# Patient Record
Sex: FEMALE | Race: WHITE | NOT HISPANIC OR LATINO | Employment: FULL TIME | URBAN - METROPOLITAN AREA
[De-identification: names, ages, dates, MRNs, and addresses within clinical notes are randomized per-mention and may not be internally consistent; named-entity substitution may affect disease eponyms.]

---

## 2022-04-14 ENCOUNTER — INITIAL PRENATAL (OUTPATIENT)
Dept: OBSTETRICS AND GYNECOLOGY | Facility: CLINIC | Age: 27
End: 2022-04-14

## 2022-04-14 VITALS
SYSTOLIC BLOOD PRESSURE: 128 MMHG | HEIGHT: 64 IN | BODY MASS INDEX: 22.71 KG/M2 | WEIGHT: 133 LBS | DIASTOLIC BLOOD PRESSURE: 74 MMHG

## 2022-04-14 DIAGNOSIS — Z34.80 SUPERVISION OF OTHER NORMAL PREGNANCY, ANTEPARTUM: Primary | ICD-10-CM

## 2022-04-14 DIAGNOSIS — Z98.891 PREVIOUS CESAREAN SECTION: ICD-10-CM

## 2022-04-14 DIAGNOSIS — Z32.01 PREGNANCY TEST PERFORMED, PREGNANCY CONFIRMED: ICD-10-CM

## 2022-04-14 LAB
ABO GROUP BLD: NORMAL
B-HCG UR QL: POSITIVE
BASOPHILS # BLD AUTO: 0.03 10*3/MM3 (ref 0–0.2)
BASOPHILS NFR BLD AUTO: 0.5 % (ref 0–1.5)
BLD GP AB SCN SERPL QL: NEGATIVE
DEPRECATED RDW RBC AUTO: 40 FL (ref 37–54)
EOSINOPHIL # BLD AUTO: 0.03 10*3/MM3 (ref 0–0.4)
EOSINOPHIL NFR BLD AUTO: 0.5 % (ref 0.3–6.2)
ERYTHROCYTE [DISTWIDTH] IN BLOOD BY AUTOMATED COUNT: 11.7 % (ref 12.3–15.4)
EXPIRATION DATE: ABNORMAL
GLUCOSE UR STRIP-MCNC: NEGATIVE MG/DL
HBV SURFACE AG SERPL QL IA: NORMAL
HCT VFR BLD AUTO: 43.4 % (ref 34–46.6)
HCV AB SER DONR QL: NORMAL
HGB BLD-MCNC: 14.5 G/DL (ref 12–15.9)
HIV1+2 AB SER QL: NORMAL
IMM GRANULOCYTES # BLD AUTO: 0.02 10*3/MM3 (ref 0–0.05)
IMM GRANULOCYTES NFR BLD AUTO: 0.3 % (ref 0–0.5)
INTERNAL NEGATIVE CONTROL: ABNORMAL
INTERNAL POSITIVE CONTROL: ABNORMAL
LYMPHOCYTES # BLD AUTO: 1.99 10*3/MM3 (ref 0.7–3.1)
LYMPHOCYTES NFR BLD AUTO: 30.4 % (ref 19.6–45.3)
Lab: ABNORMAL
MCH RBC QN AUTO: 32.2 PG (ref 26.6–33)
MCHC RBC AUTO-ENTMCNC: 33.4 G/DL (ref 31.5–35.7)
MCV RBC AUTO: 96.2 FL (ref 79–97)
MONOCYTES # BLD AUTO: 0.5 10*3/MM3 (ref 0.1–0.9)
MONOCYTES NFR BLD AUTO: 7.6 % (ref 5–12)
NEUTROPHILS NFR BLD AUTO: 3.98 10*3/MM3 (ref 1.7–7)
NEUTROPHILS NFR BLD AUTO: 60.7 % (ref 42.7–76)
NRBC BLD AUTO-RTO: 0 /100 WBC (ref 0–0.2)
PLATELET # BLD AUTO: 313 10*3/MM3 (ref 140–450)
PMV BLD AUTO: 10.1 FL (ref 6–12)
PROT UR STRIP-MCNC: NEGATIVE MG/DL
RBC # BLD AUTO: 4.51 10*6/MM3 (ref 3.77–5.28)
RH BLD: POSITIVE
WBC NRBC COR # BLD: 6.55 10*3/MM3 (ref 3.4–10.8)

## 2022-04-14 PROCEDURE — G0432 EIA HIV-1/HIV-2 SCREEN: HCPCS | Performed by: STUDENT IN AN ORGANIZED HEALTH CARE EDUCATION/TRAINING PROGRAM

## 2022-04-14 PROCEDURE — 81025 URINE PREGNANCY TEST: CPT | Performed by: STUDENT IN AN ORGANIZED HEALTH CARE EDUCATION/TRAINING PROGRAM

## 2022-04-14 PROCEDURE — 87661 TRICHOMONAS VAGINALIS AMPLIF: CPT | Performed by: STUDENT IN AN ORGANIZED HEALTH CARE EDUCATION/TRAINING PROGRAM

## 2022-04-14 PROCEDURE — G0123 SCREEN CERV/VAG THIN LAYER: HCPCS | Performed by: STUDENT IN AN ORGANIZED HEALTH CARE EDUCATION/TRAINING PROGRAM

## 2022-04-14 PROCEDURE — 87591 N.GONORRHOEAE DNA AMP PROB: CPT | Performed by: STUDENT IN AN ORGANIZED HEALTH CARE EDUCATION/TRAINING PROGRAM

## 2022-04-14 PROCEDURE — 86803 HEPATITIS C AB TEST: CPT | Performed by: STUDENT IN AN ORGANIZED HEALTH CARE EDUCATION/TRAINING PROGRAM

## 2022-04-14 PROCEDURE — 83020 HEMOGLOBIN ELECTROPHORESIS: CPT | Performed by: STUDENT IN AN ORGANIZED HEALTH CARE EDUCATION/TRAINING PROGRAM

## 2022-04-14 PROCEDURE — 87086 URINE CULTURE/COLONY COUNT: CPT | Performed by: STUDENT IN AN ORGANIZED HEALTH CARE EDUCATION/TRAINING PROGRAM

## 2022-04-14 PROCEDURE — 0501F PRENATAL FLOW SHEET: CPT | Performed by: STUDENT IN AN ORGANIZED HEALTH CARE EDUCATION/TRAINING PROGRAM

## 2022-04-14 PROCEDURE — 87491 CHLMYD TRACH DNA AMP PROBE: CPT | Performed by: STUDENT IN AN ORGANIZED HEALTH CARE EDUCATION/TRAINING PROGRAM

## 2022-04-14 NOTE — PROGRESS NOTES
"OB Initial Visit    CC- Here for care of current pregnancy     DENNIS Finalized: Unable to finalize dates, needs dating U/S    Subjective:  26 y.o.  presenting for her first obstetrical visit.    LMP: Patient's last menstrual period was 2022. sure    COMPLAINS OF: Pt has no complaints, is doing well    Zohra Cosme is a 26 y.o.  5w2d.   D&C . Did not had a period until March. She was using an licha and this lined up with when she was to have her menses. No VB, some nausea and vomiting. Not needing any meds. Has been taking a PNV. G1 delivered at 38 weeks via C/S for fetal intolerance upon evaluation for ruptured membranes. She delivered at [facility]. Denies any complications during antepartum course. 2019. G2 2021 found out pregnant. She went 4 10 week, had a blighted ovum and underwent a D&C. No other surgeries besides C/S and D&C. No hx of CV or pulm. Nonsmoker, no alcohol, no illicit. No STIs.        Reviewed and updated:  OBHx, GYNHx (STDs), PMHx, Medications, Allergies, PSHx, Social Hx, Preventative Hx (PAP), Hx of abuse/safe environment, Vaccine Hx including hx of chickenpox or vaccine, Genetic Hx (pt, FOB, both families).        Objective:  /74   Ht 162.6 cm (64\")   Wt 60.3 kg (133 lb)   LMP 2022   BMI 22.83 kg/m²   General- NAD, alert and oriented, appropriate  Psych- Normal mood, good memory  Neck- No masses, no thyroid enlargement  CV- Regular rhythm, no murnurs  Resp- CTA to bases, no wheezes  Abdomen- Soft, non distended, non tender, no masses,     Breast left- No mass, non tender, no nipple discharge  Breast right- No mass, non tender, no nipple discharge    External genitalia- Normal, no lesions  Urethra- Normal, no masses, non tender  Vagina- Normal, no discharge  Bladder- Normal, no masses, non tender  Cvx- Normal, no lesions, no discharge, no CMT  Uterus- Normal shape and consistency, non tender  Adnexa- Normal, no mass, non " tender    Lymphatic- No palpable neck, axillary, or groin nodes  Ext- No edema, no cyanosis    Skin- No lesions, no rashes, no acanthosis nigricans      Assessment and Plan:  Diagnoses and all orders for this visit:    1. Supervision of other normal pregnancy, antepartum (Primary)  Overview:  Initial visit:  • PNL: 2022   • PAP/GC/CT/Urine culture:  • Blood type:  • Hx of HSV?:  No    Genetic testing:    • Considering   • CF  • NIPS    Vaccinations:  • COVID: 2022 recommended, Hx of COVID x2     24-28 weeks:  • 1hr GCT:  • Hct/Plt:  • Tdap Rx  • Rhogam indicated:      Third Trimester:  • GBS  • Breast pump:    Ultrasound:  • Datin2022 ordered  • Anatomy:   • Follow up:     PLAN:          Orders:  -     POC Urinalysis Dipstick  -     Urine Culture - Urine, Urine, Clean Catch  -     Hemoglobinopathy Fractionation [place]; Future  -     OB Panel With HIV; Future  -     US Ob < 14 Weeks Single or First Gestation; Future  -     IGP,CtNgTv,rfx Aptima HPV ASCU    2. Pregnancy test performed, pregnancy confirmed  -     POC Pregnancy, Urine    3. Previous  section  Overview:  2022 Fetal intolerance             5w2d    Genetic Screening: Considering   CF  NIPS    Vaccines: Recommend COVID vaccine, R/B discussed    Counseling: Nutrition discussed, calories, activity/exercise in pregnancy  Discussed dietary restrictions/safety food preparation in pregnancy  Reviewed what to expect prenatal visits, office providers  Appropriate trimester precautions provided, N/V, vag bleeding, cramping  Questions answered    Labs: Prenatal labs, cultures, and PAP performed (prn)    Return in about 6 weeks (around 2022) for Next scheduled follow up.      Gordon English MD  2022

## 2022-04-15 LAB
BACTERIA SPEC AEROBE CULT: NO GROWTH
HGB A MFR BLD ELPH: 97.3 % (ref 96.4–98.8)
HGB A2 MFR BLD ELPH: 2.7 % (ref 1.8–3.2)
HGB F MFR BLD ELPH: 0 % (ref 0–2)
HGB FRACT BLD-IMP: NORMAL
HGB S MFR BLD ELPH: 0 %
RPR SER QL: NORMAL
RUBV IGG SERPL IA-ACNC: 1.07 INDEX

## 2022-04-20 LAB
C TRACH RRNA CVX QL NAA+PROBE: NEGATIVE
CONV .: NORMAL
CYTOLOGIST CVX/VAG CYTO: NORMAL
CYTOLOGY CVX/VAG DOC CYTO: NORMAL
CYTOLOGY CVX/VAG DOC THIN PREP: NORMAL
DX ICD CODE: NORMAL
HIV 1 & 2 AB SER-IMP: NORMAL
N GONORRHOEA RRNA CVX QL NAA+PROBE: NEGATIVE
OTHER STN SPEC: NORMAL
STAT OF ADQ CVX/VAG CYTO-IMP: NORMAL
T VAGINALIS RRNA SPEC QL NAA+PROBE: NEGATIVE

## 2022-04-25 ENCOUNTER — TELEPHONE (OUTPATIENT)
Dept: OBSTETRICS AND GYNECOLOGY | Facility: CLINIC | Age: 27
End: 2022-04-25

## 2022-04-25 DIAGNOSIS — Z34.80 SUPERVISION OF OTHER NORMAL PREGNANCY, ANTEPARTUM: Primary | ICD-10-CM

## 2022-04-25 NOTE — TELEPHONE ENCOUNTER
Per Dr. Martin: please let the patient know that her ultrasound looks normal but the fetal heart rate appears very early.  I would like to repeat the ultrasound in about 2 weeks just to ensure that everything continues to develop normally.  She should have a follow-up with Dr. English after that ultrasound.    Left message for the patient to advised her of Dr. Martin's recommendations and set up the needed appointments.

## 2022-04-25 NOTE — TELEPHONE ENCOUNTER
Patient informed of results and recommendations. She is scheduled for the repeat scan 5/9 and to see Dr. Martin afterwards since Dr. English is out.

## 2022-05-09 ENCOUNTER — ROUTINE PRENATAL (OUTPATIENT)
Dept: OBSTETRICS AND GYNECOLOGY | Facility: CLINIC | Age: 27
End: 2022-05-09

## 2022-05-09 VITALS — DIASTOLIC BLOOD PRESSURE: 68 MMHG | SYSTOLIC BLOOD PRESSURE: 118 MMHG | WEIGHT: 135 LBS | BODY MASS INDEX: 23.17 KG/M2

## 2022-05-09 DIAGNOSIS — Z34.80 SUPERVISION OF OTHER NORMAL PREGNANCY, ANTEPARTUM: Primary | ICD-10-CM

## 2022-05-09 DIAGNOSIS — Z98.891 PREVIOUS CESAREAN SECTION: ICD-10-CM

## 2022-05-09 LAB
GLUCOSE UR STRIP-MCNC: NEGATIVE MG/DL
PROT UR STRIP-MCNC: NEGATIVE MG/DL

## 2022-05-09 PROCEDURE — 0502F SUBSEQUENT PRENATAL CARE: CPT | Performed by: OBSTETRICS & GYNECOLOGY

## 2022-05-09 NOTE — PROGRESS NOTES
OB FOLLOW UP    CC: Scheduled OB routine FU     Prenatal care complicated by:   Patient Active Problem List   Diagnosis   • Supervision of other normal pregnancy, antepartum   • Previous  section       Subjective:   Patient has: No complaints, No leaking fluid, No vaginal bleeding, No contractions    Objective:  Urine glucose/protein- see flow sheet      /68   Wt 61.2 kg (135 lb)   LMP 2022   BMI 23.17 kg/m²   See OB flow for LE edema, and cvx exam if applicable  FHT: 181 BPM   Uterine Size: size equals dates    Study Result    Date of study: 2022  Indications: Unsure dates  Comparisons: None  Techniques: Transvaginal ultrasound     There is a helms intrauterine gestation.  Cardiac activity is noted at 182 bpm.  The average crown-rump length is 1.79 cm.  This yielded an estimated gestational age by today's study of 8 weeks and 2 days and an DENNIS of 2022.  This is consistent with the patient's EDC by LMP of 2022.  The best EDC will then be 2022.  The ovaries are not well visualized.  No gross abnormalities are noted.  The study is limited by gestational age.     Electronically signed by Dave Martin MD, 05/10/22, 8:47 AM EDT.      Assessment and Plan:  Diagnoses and all orders for this visit:    1. Supervision of other normal pregnancy, antepartum (Primary)  Overview:  Genetic testing:    • Considering     Vaccinations:  • COVID: 2022 recommended, Hx of COVID x2     Assessment & Plan:  Reviewed prenatal labs.  Reviewed today's ultrasound.  EDC confirmed.  Patient is still considering if she desires prenatal genetic screening.  Continue prenatal vitamins    Orders:  -     POC Urinalysis Dipstick    2. Previous  section  Overview:  2022 Fetal intolerance       8w6d  Reassuring pregnancy progress    Counseling: First trimester precautions, bleeding, cramping, N/V    Questions answered    Return in about 4 weeks (around 2022) for  Juju.      Dave Martin MD  05/09/2022

## 2022-05-15 NOTE — ASSESSMENT & PLAN NOTE
Reviewed prenatal labs.  Reviewed today's ultrasound.  EDC confirmed.  Patient is still considering if she desires prenatal genetic screening.  Continue prenatal vitamins

## 2022-06-07 ENCOUNTER — ROUTINE PRENATAL (OUTPATIENT)
Dept: OBSTETRICS AND GYNECOLOGY | Facility: CLINIC | Age: 27
End: 2022-06-07

## 2022-06-07 VITALS — SYSTOLIC BLOOD PRESSURE: 117 MMHG | BODY MASS INDEX: 23.62 KG/M2 | DIASTOLIC BLOOD PRESSURE: 77 MMHG | WEIGHT: 137.6 LBS

## 2022-06-07 DIAGNOSIS — Z34.80 SUPERVISION OF OTHER NORMAL PREGNANCY, ANTEPARTUM: Primary | ICD-10-CM

## 2022-06-07 DIAGNOSIS — Z98.891 PREVIOUS CESAREAN SECTION: ICD-10-CM

## 2022-06-07 LAB
GLUCOSE UR STRIP-MCNC: NEGATIVE MG/DL
PROT UR STRIP-MCNC: NEGATIVE MG/DL

## 2022-06-07 PROCEDURE — 0502F SUBSEQUENT PRENATAL CARE: CPT | Performed by: OBSTETRICS & GYNECOLOGY

## 2022-06-07 NOTE — PROGRESS NOTES
OB FOLLOW UP    CC: Scheduled OB routine FU     Prenatal care complicated by:   Patient Active Problem List   Diagnosis   • Supervision of other normal pregnancy, antepartum   • Previous  section       Subjective:   Patient has: No complaints, No leaking fluid, No vaginal bleeding, No contractions  No fetal movement yet    Objective:  Urine glucose/protein- see flow sheet      /77   Wt 62.4 kg (137 lb 9.6 oz)   LMP 2022   BMI 23.62 kg/m²   See OB flow for LE edema, and cvx exam if applicable  FHT: 165 BPM   Uterine Size: size equals dates      Assessment and Plan:  Diagnoses and all orders for this visit:    1. Supervision of other normal pregnancy, antepartum (Primary)  Overview:  Initial visit:  • PNL: 2022   • PAP/GC/CT/Urine culture:  • Blood type:  • Hx of HSV?:  No    Genetic testing:    Nips:    Vaccinations:  • COVID: 2022 recommended, Hx of COVID x2     24-28 weeks:  • 1hr GCT:  • Hct/Plt:  • Tdap Rx  • Rhogam indicated:      Third Trimester:  • GBS  • Breast pump:    Ultrasound:  • Datin2022 ordered  • Anatomy:   • Follow up:     Assessment & Plan:  Reviewed prenatal labs.  Reviewed dating ultrasound and final EDC.  Patient has elected to proceed with nips testing  Continue prenatal vitamins  Anatomy ultrasound at around 20 weeks of gestation    Orders:  -     POC Urinalysis Dipstick  -     INVITAE NIPS  -     US Ob Detail Fetal Anatomy Single or First Gestation; Future    2. Previous  section  Overview:  2022 Fetal intolerance           13w0d  Reassuring pregnancy progress    Counseling: Second trimester precautions  OB precautions, leaking, VB, frances epps vs PTL/Labor    Questions answered    Return in about 4 weeks (around 2022) for Recheck.      Dave Martin MD  2022

## 2022-06-08 NOTE — ASSESSMENT & PLAN NOTE
Reviewed prenatal labs.  Reviewed dating ultrasound and final EDC.  Patient has elected to proceed with nips testing  Continue prenatal vitamins  Anatomy ultrasound at around 20 weeks of gestation

## 2022-06-17 LAB — REF LAB TEST METHOD: NORMAL

## 2022-07-05 ENCOUNTER — ROUTINE PRENATAL (OUTPATIENT)
Dept: OBSTETRICS AND GYNECOLOGY | Facility: CLINIC | Age: 27
End: 2022-07-05

## 2022-07-05 VITALS — BODY MASS INDEX: 23.52 KG/M2 | WEIGHT: 137 LBS | SYSTOLIC BLOOD PRESSURE: 113 MMHG | DIASTOLIC BLOOD PRESSURE: 70 MMHG

## 2022-07-05 DIAGNOSIS — B00.1 HERPES LABIALIS: ICD-10-CM

## 2022-07-05 DIAGNOSIS — Z34.80 SUPERVISION OF OTHER NORMAL PREGNANCY, ANTEPARTUM: Primary | ICD-10-CM

## 2022-07-05 DIAGNOSIS — Z98.891 PREVIOUS CESAREAN SECTION: ICD-10-CM

## 2022-07-05 LAB
GLUCOSE UR STRIP-MCNC: NEGATIVE MG/DL
PROT UR STRIP-MCNC: NEGATIVE MG/DL

## 2022-07-05 PROCEDURE — 0502F SUBSEQUENT PRENATAL CARE: CPT | Performed by: OBSTETRICS & GYNECOLOGY

## 2022-07-05 NOTE — PROGRESS NOTES
OB FOLLOW UP    CC: Scheduled OB routine FU     Prenatal care complicated by:   Patient Active Problem List   Diagnosis   • Supervision of other normal pregnancy, antepartum   • Previous  section   • Herpes labialis       Subjective:   Patient has: No complaints, No leaking fluid, No vaginal bleeding, No contractions  No fetal movement yet    Objective:  Urine glucose/protein- see flow sheet      /70   Wt 62.1 kg (137 lb)   LMP 2022   BMI 23.52 kg/m²   See OB flow for LE edema, and cvx exam if applicable  FHT: 159 BPM   Uterine Size: size equals dates      Assessment and Plan:  Diagnoses and all orders for this visit:    1. Supervision of other normal pregnancy, antepartum (Primary)  Assessment & Plan:  Continue prenatal vitamins  Anatomy ultrasound next office visit    Orders:  -     POC Urinalysis Dipstick    2. Previous  section  Overview:  2022 Fetal intolerance       3. Herpes labialis  Overview:  Valtrex suppression at 36 weeks          17w0d  Reassuring pregnancy progress    Counseling: Second trimester precautions  OB precautions, leaking, VB, frances epps vs PTL/Labor    Questions answered    Return in about 4 weeks (around 2022).      Dave Martin MD  2022

## 2022-07-26 ENCOUNTER — ROUTINE PRENATAL (OUTPATIENT)
Dept: OBSTETRICS AND GYNECOLOGY | Facility: CLINIC | Age: 27
End: 2022-07-26

## 2022-07-26 VITALS — SYSTOLIC BLOOD PRESSURE: 113 MMHG | BODY MASS INDEX: 24.37 KG/M2 | DIASTOLIC BLOOD PRESSURE: 74 MMHG | WEIGHT: 142 LBS

## 2022-07-26 DIAGNOSIS — Z34.80 SUPERVISION OF OTHER NORMAL PREGNANCY, ANTEPARTUM: Primary | ICD-10-CM

## 2022-07-26 DIAGNOSIS — Z98.891 PREVIOUS CESAREAN SECTION: ICD-10-CM

## 2022-07-26 LAB
GLUCOSE UR STRIP-MCNC: NEGATIVE MG/DL
PROT UR STRIP-MCNC: ABNORMAL MG/DL

## 2022-07-26 PROCEDURE — 0502F SUBSEQUENT PRENATAL CARE: CPT | Performed by: OBSTETRICS & GYNECOLOGY

## 2022-07-26 NOTE — PROGRESS NOTES
OB FOLLOW UP    CC: Scheduled OB routine FU     Prenatal care complicated by:   Patient Active Problem List   Diagnosis   • Supervision of other normal pregnancy, antepartum   • Previous  section   • Herpes labialis       Subjective:   Patient has: No complaints, No leaking fluid, No vaginal bleeding, No contractions, Adequate FM    Objective:  Urine glucose/protein- see flow sheet      /74   Wt 64.4 kg (142 lb)   LMP 2022   BMI 24.37 kg/m²   See OB flow for LE edema, and cvx exam if applicable  FHT: 155 BPM   Uterine Size: 20 cm    Study Result    Date of study: 2022  Indications: Anatomy  Comparisons: None  Techniques: Transabdominal ultrasound     There is a helms intrauterine gestation in the cephalic presentation.  The estimated fetal weight is 293 g or 10 ounces.  The estimated gestational age is 19 weeks and 5 days with an DENNIS of 12/15/2022 by today's study.  This is consistent with the previously established DENNIS of 2022.  The fetal heart rate is 155 bpm.  The amniotic fluid is subjectively adequate.  The placenta is posterior, grade 1.  There is no evidence of low-lying placenta or placenta previa.  There is a three-vessel umbilical cord with normal-appearing cord insertion.  The fetal head is low in the pelvis providing suboptimal images of the fetal profile and cranial structures.  No anatomical abnormalities are noted.  The gender appears female.  At this time the anatomical survey remains incomplete and a follow-up study is recommended.     Electronically signed by Dave Martin MD, 22, 7:37 AM EDT.    Assessment and Plan:  Diagnoses and all orders for this visit:    1. Supervision of other normal pregnancy, antepartum (Primary)  Overview:    Genetic testing:    Nips: Neg    Vaccinations:  • COVID: 2022 recommended, Hx of COVID x2       Ultrasound:  • Datin2022: Suboptimal profile and cranial structures.  Incomplete anatomy.  Posterior  placenta.  Follow-up anatomy ultrasound ordered.    Assessment & Plan:  Reviewed today's anatomy ultrasound.  The anatomy is incomplete.  Plan follow-up ultrasound in 4 weeks.  Continue prenatal vitamins  1 hour GTT next office visit    Orders:  -     POC Urinalysis Dipstick  -     US Ob 14 + Weeks Single or First Gestation; Future    2. Previous  section  Overview:  2022 Fetal intolerance - desires repeat c/s          20w0d  Reassuring pregnancy progress    Counseling: Second trimester precautions  OB precautions, leaking, VB, frances epps vs PTL/Labor    Questions answered    Return in about 4 weeks (around 2022) for Recheck.      Dave Martin MD  2022

## 2022-07-28 NOTE — ASSESSMENT & PLAN NOTE
Reviewed today's anatomy ultrasound.  The anatomy is incomplete.  Plan follow-up ultrasound in 4 weeks.  Continue prenatal vitamins  1 hour GTT next office visit

## 2022-08-24 ENCOUNTER — ROUTINE PRENATAL (OUTPATIENT)
Dept: OBSTETRICS AND GYNECOLOGY | Facility: CLINIC | Age: 27
End: 2022-08-24

## 2022-08-24 VITALS — SYSTOLIC BLOOD PRESSURE: 99 MMHG | DIASTOLIC BLOOD PRESSURE: 65 MMHG | WEIGHT: 145.4 LBS | BODY MASS INDEX: 24.96 KG/M2

## 2022-08-24 DIAGNOSIS — Z34.80 SUPERVISION OF OTHER NORMAL PREGNANCY, ANTEPARTUM: Primary | ICD-10-CM

## 2022-08-24 DIAGNOSIS — O26.849 FETAL SIZE INCONSISTENT WITH DATES: ICD-10-CM

## 2022-08-24 DIAGNOSIS — O26.849 FETAL SIZE INCONSISTENT WITH DATES: Primary | ICD-10-CM

## 2022-08-24 DIAGNOSIS — Z98.891 PREVIOUS CESAREAN SECTION: ICD-10-CM

## 2022-08-24 LAB
DEPRECATED RDW RBC AUTO: 38.5 FL (ref 37–54)
ERYTHROCYTE [DISTWIDTH] IN BLOOD BY AUTOMATED COUNT: 11.2 % (ref 12.3–15.4)
GLUCOSE 1H P GLC SERPL-MCNC: 122 MG/DL (ref 65–139)
GLUCOSE UR STRIP-MCNC: NEGATIVE MG/DL
HCT VFR BLD AUTO: 34.9 % (ref 34–46.6)
HGB BLD-MCNC: 11.8 G/DL (ref 12–15.9)
MCH RBC QN AUTO: 32.1 PG (ref 26.6–33)
MCHC RBC AUTO-ENTMCNC: 33.8 G/DL (ref 31.5–35.7)
MCV RBC AUTO: 94.8 FL (ref 79–97)
PLATELET # BLD AUTO: 246 10*3/MM3 (ref 140–450)
PMV BLD AUTO: 10.3 FL (ref 6–12)
PROT UR STRIP-MCNC: NEGATIVE MG/DL
RBC # BLD AUTO: 3.68 10*6/MM3 (ref 3.77–5.28)
WBC NRBC COR # BLD: 9.82 10*3/MM3 (ref 3.4–10.8)

## 2022-08-24 PROCEDURE — 85027 COMPLETE CBC AUTOMATED: CPT | Performed by: OBSTETRICS & GYNECOLOGY

## 2022-08-24 PROCEDURE — 82950 GLUCOSE TEST: CPT | Performed by: OBSTETRICS & GYNECOLOGY

## 2022-08-24 PROCEDURE — 0502F SUBSEQUENT PRENATAL CARE: CPT | Performed by: OBSTETRICS & GYNECOLOGY

## 2022-08-24 NOTE — PROGRESS NOTES
OB FOLLOW UP    CC: Scheduled OB routine FU     Prenatal care complicated by:   Patient Active Problem List   Diagnosis   • Supervision of other normal pregnancy, antepartum   • Previous  section   • Herpes labialis   • Fetal size inconsistent with dates       Subjective:   Patient has: No complaints, No leaking fluid, No vaginal bleeding, No contractions, Adequate FM    Objective:  Urine glucose/protein- see flow sheet      BP 99/65   Wt 66 kg (145 lb 6.4 oz)   LMP 2022   BMI 24.96 kg/m²   See OB flow for LE edema, and cvx exam if applicable  FHT: 153 BPM   Uterine Size: 24 cm    Study Result    Date of study: 2022  Indications: Follow-up anatomy  Comparisons: None  Techniques: Transabdominal ultrasound     There is a helms intrauterine gestation in the breech presentation.  The estimated fetal weight is 626 g or 1 pound 6 ounces.  This is the 25th percentile for gestational age.  The abdominal circumference is at the 28th percentile.  The head circumference is at the 16th percentile.  The BPD is at the 2nd percentile.  The TANYA is 13.78 cm.  The fetal heart rate is 153 bpm.  The intracranial structures appear normal.  The fetal profile appears normal.  No other anatomical abnormalities are noted on today's limited study.  This completes the anatomical survey.  The placenta is posterior, grade 1.  There is no evidence of low-lying placenta or placenta. previa.  There is appropriate interval fetal growth, however the BPD measurement is lagging.  This may be due to technical difficulties with fetal positioning.  The head circumference is normal.  The overall estimated fetal weight is in the appropriate range.  I have recommended a follow-up study in 4 weeks to reevaluate the fetal growth and particularly the head measurements.  If the BPD continues to significantly lag, or there is poor interval growth I would recommend follow-up with maternal-fetal medicine.  Today's results and  recommendations were communicated with the patient and her  the time of their visit.     Electronically signed by Dave Martin MD, 22, 8:21 AM EDT.      Assessment and Plan:  Diagnoses and all orders for this visit:    1. Supervision of other normal pregnancy, antepartum (Primary)  Overview:  Genetic testing:    Nips: Neg    Vaccinations:  • COVID: 2022 recommended, Hx of COVID x2       Ultrasound:  • Anatomy: 2022: Suboptimal profile and cranial structures.  Incomplete anatomy.  Posterior placenta.  Follow-up anatomy ultrasound ordered.  • Follow up: Small BPD and 2nd percentile.  Overall EFW at the 25th percentile.  Abdominal circumference at the 28th percentile.  Normal anatomy.    Assessment & Plan:  Reviewed today's ultrasound findings.  The BPD is small.  The remainder the ultrasound parameters are appropriate.  Plan follow-up in 4 weeks.  If the BPD continues to lag and plan for MFM referral  Continue prenatal vitamins  Fetal kick counts   labor warnings  1 hour GTT  Tdap Rx    Orders:  -     POC Urinalysis Dipstick  -     Gestational Diabetes Screen 1 Hour; Future  -     CBC (No Diff); Future  -     Gestational Diabetes Screen 1 Hour  -     CBC (No Diff)    2. Previous  section  Overview:  2022 Fetal intolerance - desires repeat c/s      3. Fetal size inconsistent with dates  Assessment & Plan:  Small BBD.  Check growth ultrasound in 4 weeks.  If growth continues to be concerning then plan for MFM consultation.    Orders:  -     US Pelvis Transvaginal Non OB; Future        24w1d  Reassuring pregnancy progress    Counseling: OB precautions, leaking, VB, frances epps vs PTL/Labor  [facility]    Questions answered    Return in about 4 weeks (around 2022).      Dave Martin MD  2022

## 2022-08-29 PROBLEM — O26.849 FETAL SIZE INCONSISTENT WITH DATES: Status: ACTIVE | Noted: 2022-08-29

## 2022-08-29 NOTE — ASSESSMENT & PLAN NOTE
Reviewed today's ultrasound findings.  The BPD is small.  The remainder the ultrasound parameters are appropriate.  Plan follow-up in 4 weeks.  If the BPD continues to lag and plan for MFM referral  Continue prenatal vitamins  Fetal kick counts   labor warnings  1 hour GTT  Tdap Rx

## 2022-08-29 NOTE — ASSESSMENT & PLAN NOTE
Small BBD.  Check growth ultrasound in 4 weeks.  If growth continues to be concerning then plan for MFM consultation.

## 2022-09-20 ENCOUNTER — ROUTINE PRENATAL (OUTPATIENT)
Dept: OBSTETRICS AND GYNECOLOGY | Facility: CLINIC | Age: 27
End: 2022-09-20

## 2022-09-20 VITALS — SYSTOLIC BLOOD PRESSURE: 121 MMHG | BODY MASS INDEX: 25.23 KG/M2 | WEIGHT: 147 LBS | DIASTOLIC BLOOD PRESSURE: 78 MMHG

## 2022-09-20 DIAGNOSIS — Z34.80 SUPERVISION OF OTHER NORMAL PREGNANCY, ANTEPARTUM: Primary | ICD-10-CM

## 2022-09-20 DIAGNOSIS — O26.849 FETAL SIZE INCONSISTENT WITH DATES: ICD-10-CM

## 2022-09-20 DIAGNOSIS — Z98.891 PREVIOUS CESAREAN SECTION: ICD-10-CM

## 2022-09-20 LAB
GLUCOSE UR STRIP-MCNC: ABNORMAL MG/DL
PROT UR STRIP-MCNC: NEGATIVE MG/DL

## 2022-09-20 PROCEDURE — 0502F SUBSEQUENT PRENATAL CARE: CPT | Performed by: OBSTETRICS & GYNECOLOGY

## 2022-09-23 NOTE — ASSESSMENT & PLAN NOTE
Reviewed today's ultrasound findings.  Fetal growth is reassuring.  1 hour GTT normal  Continue prenatal vitamins  Fetal kick counts   labor warnings

## 2022-09-23 NOTE — PROGRESS NOTES
OB FOLLOW UP    CC: Scheduled OB routine FU     Prenatal care complicated by:   Patient Active Problem List   Diagnosis   • Supervision of other normal pregnancy, antepartum   • Previous  section   • Herpes labialis   • Fetal size inconsistent with dates       Subjective:   Patient has: No complaints, No leaking fluid, No vaginal bleeding, No contractions, Adequate FM    Objective:  Urine glucose/protein- see flow sheet      /78   Wt 66.7 kg (147 lb)   LMP 2022   BMI 25.23 kg/m²   See OB flow for LE edema, and cvx exam if applicable  FHT: 168 BPM   Uterine Size: 28 cm    Study Result    Date of study: 2022  Indications: Size less than dates  Comparisons: None  Techniques: Transabdominal ultrasound     There is a helms intrauterine gestation in the cephalic presentation.  The estimated fetal weight is 1235 g, or 2 pounds and 12 ounces.  This is the 56 percentile for gestational age.  The BPD measures at the 28th percentile.  The head circumference measured at the 34th percentile.  The abdominal circumference measures at the 53rd percentile.  The femur length measures at the 62nd percentile.  The TANYA is 12.63 cm.  The fetal heart rate is 168 bpm.  The placenta is posterior, grade 1.  There is no evidence of low-lying placenta or placenta previa.  Appropriate interval growth is noted.  No anatomical abnormalities are noted on today's study.     Electronically signed by Dave Martin MD, 22, 8:26 AM EDT.    Assessment and Plan:  Diagnoses and all orders for this visit:    1. Supervision of other normal pregnancy, antepartum (Primary)  Overview:  Genetic testing:    Nips: Neg    Vaccinations:  • COVID: 2022 recommended, Hx of COVID x2       Ultrasound:  • Anatomy: 2022: Suboptimal profile and cranial structures.  Incomplete anatomy.  Posterior placenta.  Follow-up anatomy ultrasound ordered.  • Follow up: Small BPD and 2nd percentile.  Overall EFW at the 25th  percentile.  Abdominal circumference at the 28th percentile.  Normal anatomy.    Assessment & Plan:  Reviewed today's ultrasound findings.  Fetal growth is reassuring.  1 hour GTT normal  Continue prenatal vitamins  Fetal kick counts   labor warnings    Orders:  -     POC Urinalysis Dipstick    2. Previous  section  Overview:  2022 Fetal intolerance - desires repeat c/s      3. Fetal size inconsistent with dates  Overview:  Ultrasound 2022: EFW is at the 56 percentile.  The BPD measurement is at the 28th percentile and head circumference at the 34th percentile.    Assessment & Plan:  Reviewed today's ultrasound findings.  Proper interval growth noted.  BPD measurement is normal.          28w3d  Reassuring pregnancy progress    Counseling: OB precautions, leaking, VB, frances epps vs PTL/Labor  [facility]    Questions answered    Return in about 2 weeks (around 10/4/2022).      Dave Martin MD  2022

## 2022-10-05 NOTE — PROGRESS NOTES
Routine Prenatal Visit     Subjective  Zohra Cosme is a 27 y.o.  at 30w1d here for her routine OB visit.   She is taking her prenatal vitamins.Reports no loss of fluid or vaginal bleeding. Patient doing well without any complaints. Pregnancy complicated by:     Patient Active Problem List   Diagnosis   • Supervision of other normal pregnancy, antepartum   • Previous  section   • Herpes labialis   • Fetal size inconsistent with dates     DENNIS finalize:Estimated Date of Delivery: 22      Genetic testing (NIPS-Quad)/:  NIPS negative  Flu: Obtained   Tdap:Prescription given     Rhogam: O Positive    Sterilization:No     Anatomy US: 2022: Suboptimal profile and cranial structures.  Incomplete anatomy.  Posterior placenta.    Follow up: Small BPD and 2nd percentile.  Overall EFW at the 25th percentile.  Abdominal circumference at the 28th percentile.  Normal anatomy.        OB History    Para Term  AB Living   3 1 1   1 1   SAB IAB Ectopic Molar Multiple Live Births   1         1      # Outcome Date GA Lbr Carl/2nd Weight Sex Delivery Anes PTL Lv   3 Current            2 SAB            1 Term 2019 38w0d  3090 g (6 lb 13 oz) F CS-Unspec   DOMINGO           ROS:   General ROS: negative for - chills or fatigue  Respiratory ROS: negative for - cough or hemoptysis  Cardiovascular ROS: negative for - chest pain or dyspnea on exertion  Genito-Urinary ROS: negative for  change in urinary stream, vaginal discharge   Musculoskeletal ROS: negative for - gait disturbance or joint pain  Dermatological ROS: negative for acne,  dry skin or itching    Objective  Physical Exam:   Vitals:    10/06/22 1252   BP: 128/81       Uterine Size: size equals dates  FHT: 145    General appearance - alert, well appearing, and in no distress  Mental status - alert, oriented to person, place, and time  Abdomen- Soft, Gravid uterus, non-tender to palpation  Musculoskeletal: negative for - gait disturbance  or joint pain  Extremeties: negative swelling or cyanosis   Dermatological: negative rashes or skin lesions     Assessment  Diagnoses and all orders for this visit:    1. 30 weeks gestation of pregnancy (Primary)  -     POC Urinalysis Dipstick    2. Previous  section    3. Supervision of other normal pregnancy, antepartum    4. HSV infection          Plan  1. IUP at 30w1d   Patient denies any vaginal bleeding, loss of fluid or decreased fetal movements. Patient counseled on si/sx of preeclampsia and was given  labor precautions.   - Kick counts reviewed  - Patient instructed to monitor frequent/regular CTXs, leakage of fluid, vaginal bleeding or decreased fetal movements  - Tdap offered and agreed to the recommended immunizations  - Flu offered and agreed to the recommended immunizations  -Patient to scheduled CS with Dr. Be at next visit   -H/O HSV infection-oral, will start valtrex at 36 weeks     Counseling:   OB precautions, leaking, VB, frances epps vs PTL/Labor  HTN precautions, HA, vision change, RUQ/epigastric pain, edema  Round Ligament Pain:  The uterus has several ligaments which provide support and keep the uterus in place. As the  uterus grows these ligaments are pulled and stretched which often causes sharp stabbing like pain in the inguinal area.   You may find a pregnancy support band helpful. Changing positions may also help. Yoga is a great way to cope with round ligament and low back pain in pregnancy.    Massage may also help with low back pain   Things to Consider at this Point in your Pregnancy:  Some women experience swelling in their feet during pregnancy. Compression stockings may help  Drink plenty of water and stay active   Make sure you are eating frequent small meals, nuts are a wonderful snack to keep with you            Return in about 2 weeks (around 10/20/2022) for Routine OB visit.      We have gone over prenatal care to include the timing and content of visits. I  informed her how to contact the office and/or on call person in the event of any problems and encouraged her to do so when she feels it is necessary.  We then spent time answering her questions which she indicated were answered to her satisfaction.    Mary Zavala DO  10/6/2022 13:32 EDT

## 2022-10-06 ENCOUNTER — ROUTINE PRENATAL (OUTPATIENT)
Dept: OBSTETRICS AND GYNECOLOGY | Facility: CLINIC | Age: 27
End: 2022-10-06

## 2022-10-06 VITALS — DIASTOLIC BLOOD PRESSURE: 81 MMHG | BODY MASS INDEX: 26.33 KG/M2 | WEIGHT: 153.4 LBS | SYSTOLIC BLOOD PRESSURE: 128 MMHG

## 2022-10-06 DIAGNOSIS — Z98.891 PREVIOUS CESAREAN SECTION: ICD-10-CM

## 2022-10-06 DIAGNOSIS — B00.9 HSV INFECTION: ICD-10-CM

## 2022-10-06 DIAGNOSIS — Z3A.30 30 WEEKS GESTATION OF PREGNANCY: Primary | ICD-10-CM

## 2022-10-06 DIAGNOSIS — Z34.80 SUPERVISION OF OTHER NORMAL PREGNANCY, ANTEPARTUM: ICD-10-CM

## 2022-10-06 LAB
GLUCOSE UR STRIP-MCNC: NEGATIVE MG/DL
PROT UR STRIP-MCNC: NEGATIVE MG/DL

## 2022-10-06 PROCEDURE — 0502F SUBSEQUENT PRENATAL CARE: CPT | Performed by: OBSTETRICS & GYNECOLOGY

## 2022-10-14 NOTE — PROGRESS NOTES
OB FOLLOW UP      Chief Complaint   Patient presents with   • Routine Prenatal Visit     Subjective:   • No complaints    Objective:  /78   Wt 69.9 kg (154 lb)   LMP 2022   BMI 26.43 kg/m²  9.526 kg (21 lb)  Uterine Size: size equals dates  FHT: 110-160 BPM    See OB flow for edema, cvx exam if performed, and Upro/Uglu    Assessment and Plan:  31w3d  Reassuring pregnancy progress.  Questions answered.  Diagnoses and all orders for this visit:    1. Supervision of other normal pregnancy, antepartum (Primary)  Overview:  DENNIS final:  by LMP and first menstrual ultrasound    Nips: Neg    COVID: Hx of COVID x2 pre preg.  10/17/2022 recommended vaccine  Rx Tdap:  Rx 6Oct  Flu vaccine: vaccinated     Sterlization?:  No    Anatomy: Suboptimal profile and cranial structures.  Incomplete anatomy.  Posterior placenta.  Follow up: Small BPD and 2nd percentile.  Overall EFW at the 25th percentile.  AC 28th percentile.  Normal anatomy.  Follow-up 22-2 pounds 12 ounces, 56%, BPD 28%, HC 34%, AC 53%, within normal limits    Problem list /plan:  History of  (fetal intol to labor)-desires repeat    39+1 RCS AP Kangaroo in [place]  History of HSV, oral- plan prophylaxis at 36 weeks    Orders:  -     POC Urinalysis Dipstick    Counseling:    • OB precautions, leaking, VB, frances epps vs PTL/Labor  • FKC  • CS scheduled  • Continue PNV.  Importance of healthy eating and staying active.    Return in about 2 weeks (around 10/31/2022) for FU OB q2wks to 36weeks then weekly to DENNIS.            Akua Be, DO  10/17/2022    [facility] [place] [facility]  [facility] [place] [address]  Dept: [phone]  Dept Fax: [phone]  Loc: [phone]  Loc Fax: [phone]

## 2022-10-17 ENCOUNTER — ROUTINE PRENATAL (OUTPATIENT)
Dept: OBSTETRICS AND GYNECOLOGY | Facility: CLINIC | Age: 27
End: 2022-10-17

## 2022-10-17 VITALS — BODY MASS INDEX: 26.43 KG/M2 | WEIGHT: 154 LBS | SYSTOLIC BLOOD PRESSURE: 110 MMHG | DIASTOLIC BLOOD PRESSURE: 78 MMHG

## 2022-10-17 DIAGNOSIS — Z34.80 SUPERVISION OF OTHER NORMAL PREGNANCY, ANTEPARTUM: Primary | ICD-10-CM

## 2022-10-17 LAB
GLUCOSE UR STRIP-MCNC: ABNORMAL MG/DL
PROT UR STRIP-MCNC: NEGATIVE MG/DL

## 2022-10-17 PROCEDURE — 0502F SUBSEQUENT PRENATAL CARE: CPT | Performed by: OBSTETRICS & GYNECOLOGY

## 2022-10-28 NOTE — PROGRESS NOTES
OB FOLLOW UP      Chief Complaint   Patient presents with   • Routine Prenatal Visit     Subjective:   • Mild constipation, better w stool softners.  Some reflux.     Objective:  /72   Wt 70.8 kg (156 lb)   LMP 2022   BMI 26.78 kg/m²  10.4 kg (23 lb)  Uterine Size: size equals dates  FHT: 110-160 BPM    See OB flow for edema, cvx exam if performed, and Upro/Uglu    Assessment and Plan:  33w6d   Reassuring pregnancy progress.  Questions answered.  Diagnoses and all orders for this visit:    1. Supervision of other normal pregnancy, antepartum (Primary)  Overview:  DENNIS final:  by LMP and first menstrual ultrasound    Nips: Neg    COVID: Hx of COVID x2 pre preg.  10/31/2022 recommended, pt declines  Tdap:  Rx 6Oct.  10/31/2022 plans on it.    Flu vaccine: vaccinated     Sterlization?:  No    Anatomy: Suboptimal profile and cranial structures.  Incomplete anatomy.  Posterior placenta.  Follow up: Small BPD and 2nd percentile.  Overall EFW at the 25th percentile.  AC 28th percentile.  Normal anatomy.  Follow-up 22-2 pounds 12 ounces, 56%, BPD 28%, HC 34%, AC 53%, within normal limits    Problem list /plan:  History of  (fetal intol to labor)-desires repeat    39+1 RCS AP Kangar[place] in [place]  History of HSV, oral- 10/31/2022 Rx prophylaxis at 36 weeks    Orders:  -     valACYclovir (VALTREX) 1000 MG tablet; Take 1 tablet by mouth Daily.  Dispense: 30 tablet; Refill: 1  -     POC Urinalysis Dipstick      Counseling:    • OB precautions, leaking, VB, frances epps vs PTL/Labor  • FKC  • GERD in pregnancy discussed.  Recommend smaller meals, avoid lying down at least 2hrs after meals, and avoid foods that trigger it (commonly highly seasoned foods, pizza, italian, mexican etc).  OTC Tums safe.  If using them regularly recommend other medication options that can be prescribed.   • Continue PNV.  Importance of healthy eating and staying active.    Return in about 2 weeks (around 2022) for  DEBBY OB, then weekly to CS.            Akua Be,   10/31/2022    [facility] [place] [facility]  [address] [place] [address]  Dept: [phone]  Dept Fax: [phone]  Loc: [phone]  Loc Fax: [phone]

## 2022-10-31 ENCOUNTER — ROUTINE PRENATAL (OUTPATIENT)
Dept: OBSTETRICS AND GYNECOLOGY | Facility: CLINIC | Age: 27
End: 2022-10-31

## 2022-10-31 VITALS — SYSTOLIC BLOOD PRESSURE: 106 MMHG | BODY MASS INDEX: 26.78 KG/M2 | DIASTOLIC BLOOD PRESSURE: 72 MMHG | WEIGHT: 156 LBS

## 2022-10-31 DIAGNOSIS — Z98.891 PREVIOUS CESAREAN SECTION: ICD-10-CM

## 2022-10-31 DIAGNOSIS — Z34.80 SUPERVISION OF OTHER NORMAL PREGNANCY, ANTEPARTUM: Primary | ICD-10-CM

## 2022-10-31 LAB
GLUCOSE UR STRIP-MCNC: NEGATIVE MG/DL
PROT UR STRIP-MCNC: NEGATIVE MG/DL

## 2022-10-31 PROCEDURE — 0502F SUBSEQUENT PRENATAL CARE: CPT | Performed by: OBSTETRICS & GYNECOLOGY

## 2022-10-31 RX ORDER — VALACYCLOVIR HYDROCHLORIDE 1 G/1
1000 TABLET, FILM COATED ORAL DAILY
Qty: 30 TABLET | Refills: 1 | Status: SHIPPED | OUTPATIENT
Start: 2022-10-31 | End: 2022-12-09 | Stop reason: HOSPADM

## 2022-11-01 ENCOUNTER — TELEPHONE (OUTPATIENT)
Dept: OBSTETRICS AND GYNECOLOGY | Facility: CLINIC | Age: 27
End: 2022-11-01

## 2022-11-01 NOTE — TELEPHONE ENCOUNTER
Caller: Zohra Cosme    Relationship to patient: Self    Best call back number: [phone]    Patient is needing: PT IS 34 WEEKS AND STATED SHE HAS BEEN HAVING A FEVER AND CHILLS SINCE LAST NIGHT. PT STATES SHE HAD A NEGATIVE COVID TEST AND HAS TAKEN TYLENOL TWICE THROUGH THE NIGHT FOR HER FEVER. PT WILL LIKE A CALL BACK IF THERES ANYTHING ELSE SHE SHOULD BE DOING, PLEASE ADVISE PT. SHE CAN BE REACHED ANYTIME, OK TO LVM.

## 2022-11-01 NOTE — TELEPHONE ENCOUNTER
Spoke to the patient and advised her she can take tylenol as directed on the bottle to control her fever. She was also advised to increase her water intake. She understands to follow up with her PCP or UC if her symptoms are not controlled.

## 2022-11-08 ENCOUNTER — TELEPHONE (OUTPATIENT)
Dept: OBSTETRICS AND GYNECOLOGY | Facility: CLINIC | Age: 27
End: 2022-11-08

## 2022-11-08 NOTE — TELEPHONE ENCOUNTER
Caller: Zohra Cosme    Relationship: Self    Best call back number: [phone]    What is the best time to reach you: ANYTIME    What was the call regarding: PT CURRENTLY TAKING AN ANTIBIOTIC FOR AN EAR INFECTION. PT WENT TO GET THE TDAP VACCINE AND THE PHARMACIST ADVISED PT TO WAIT UNTIL SHE HAD BEEN OFF THE ANTIBIOTIC FOR 2 WEEKS BEFORE RECEIVING THE VACCINE.     PT WILL BE ALMOST 38 WEEKS AT THAT TIME.     PLEASE ADVISE WHEN PT SHOULD RECEIVE THE VACCINE.    Do you require a callback: YES

## 2022-11-14 NOTE — PROGRESS NOTES
OB FOLLOW UP      Chief Complaint   Patient presents with   • Routine Prenatal Visit     Subjective:   • No complaints    Objective:  /70   Wt 72.1 kg (159 lb)   LMP 2022   BMI 27.29 kg/m²  11.8 kg (26 lb)  Uterine Size: size equals dates  FHT: 110-160 BPM  Pelvic exam: GBS RV swab performed today  See OB flow for edema, cvx exam if performed, and Upro/Uglu    Assessment and Plan:  36w0d   Reassuring pregnancy progress.  Questions answered.  Diagnoses and all orders for this visit:    1. Supervision of other normal pregnancy, antepartum (Primary)  Overview:  DENNIS final:  by LMP and first menstrual ultrasound    Nips: Neg    COVID: Hx of COVID x2 pre preg.  Recommended, pt declines  Tdap:  Rx 6Oct.  11/15/2022  plans on it.    Flu vaccine: vaccinated     Sterlization?:  No    Anatomy: Suboptimal profile and cranial structures.  Incomplete anatomy.  Posterior placenta.  Follow up: Small BPD and 2nd percentile.  Overall EFW at the 25th percentile.  AC 28th percentile.  Normal anatomy.  Follow-up 22-2 pounds 12 ounces, 56%, BPD 28%, HC 34%, AC 53%, within normal limits    Problem list /plan:  History of  (fetal intol to labor)-desires repeat    39+1 RCS AP Kangar[place] in [place]  History of HSV, oral- Continue Valtrex 1000mg daily    Orders:  -     POC Urinalysis Dipstick  -     Group B Streptococcus Culture - Swab, Vaginal/Rectum    Counseling:    • OB precautions, leaking, VB, frances epps vs PTL/Labor  • FKC  • Continue PNV.  Importance of healthy eating and staying active.    Return in about 1 week (around 2022) for FU OB q1wk to DENNIS/Delivery.            Akua Be, DO  11/15/2022    [facility] [place] [facility]  [facility] [place] [address]  Dept: [phone]  Dept Fax: [phone]  Loc: [phone]  Loc Fax: [phone]

## 2022-11-15 ENCOUNTER — ROUTINE PRENATAL (OUTPATIENT)
Dept: OBSTETRICS AND GYNECOLOGY | Facility: CLINIC | Age: 27
End: 2022-11-15

## 2022-11-15 VITALS — DIASTOLIC BLOOD PRESSURE: 70 MMHG | BODY MASS INDEX: 27.29 KG/M2 | SYSTOLIC BLOOD PRESSURE: 112 MMHG | WEIGHT: 159 LBS

## 2022-11-15 DIAGNOSIS — Z34.80 SUPERVISION OF OTHER NORMAL PREGNANCY, ANTEPARTUM: Primary | ICD-10-CM

## 2022-11-15 LAB
GLUCOSE UR STRIP-MCNC: NEGATIVE MG/DL
PROT UR STRIP-MCNC: NEGATIVE MG/DL

## 2022-11-15 PROCEDURE — 0502F SUBSEQUENT PRENATAL CARE: CPT | Performed by: OBSTETRICS & GYNECOLOGY

## 2022-11-15 PROCEDURE — 87081 CULTURE SCREEN ONLY: CPT | Performed by: OBSTETRICS & GYNECOLOGY

## 2022-11-18 LAB — BACTERIA SPEC AEROBE CULT: NORMAL

## 2022-11-21 NOTE — PROGRESS NOTES
OB FOLLOW UP      Chief Complaint   Patient presents with   • Routine Prenatal Visit     Subjective:   • No complaints    Objective:  /72   Wt 72.1 kg (159 lb)   LMP 2022   BMI 27.29 kg/m²  11.8 kg (26 lb)  Uterine Size: size equals dates  FHT: 110-160 BPM    See OB flow for edema, cvx exam if performed, and Upro/Uglu    Assessment and Plan:  37w0d   Reassuring pregnancy progress.  Questions answered.  Diagnoses and all orders for this visit:    1. Supervision of other normal pregnancy, antepartum (Primary)  Overview:  DENNIS final:  by LMP and first menstrual ultrasound    Nips: Neg    COVID: Hx of COVID x2 pre preg.  Recommended, pt declines  Tdap:  Rx 6Oct.  2022 scheduled tomorrow.  Flu vaccine: vaccinated     Sterlization?:  No    Anatomy: Suboptimal profile and cranial structures.  Incomplete anatomy.  Posterior placenta.  Follow up: Small BPD and 2nd percentile.  Overall EFW at the 25th percentile.  AC 28th percentile.  Normal anatomy.  Follow-up 22-2 pounds 12 ounces, 56%, BPD 28%, HC 34%, AC 53%, within normal limits    Problem list /plan:  History of  (fetal intol to labor)-desires repeat    39+1 RCS AP [place]  History of HSV, oral- Continue Valtrex 1000mg daily    Orders:  -     POC Urinalysis Dipstick    Counseling:    • OB precautions, leaking, VB, frances epps vs PTL/Labor  • FKC  • Continue PNV.  Importance of healthy eating and staying active.    Return in about 1 week (around 2022) for FU OB q1wk to DENNIS/Delivery.            Akua Be, DO  2022    [facility] [place] [facility]  [facility] [place] [address]  Dept: [phone]  Dept Fax: [phone]  Loc: [phone]  Loc Fax: [phone]

## 2022-11-22 ENCOUNTER — ROUTINE PRENATAL (OUTPATIENT)
Dept: OBSTETRICS AND GYNECOLOGY | Facility: CLINIC | Age: 27
End: 2022-11-22

## 2022-11-22 VITALS — SYSTOLIC BLOOD PRESSURE: 120 MMHG | BODY MASS INDEX: 27.29 KG/M2 | DIASTOLIC BLOOD PRESSURE: 72 MMHG | WEIGHT: 159 LBS

## 2022-11-22 DIAGNOSIS — Z34.80 SUPERVISION OF OTHER NORMAL PREGNANCY, ANTEPARTUM: Primary | ICD-10-CM

## 2022-11-22 LAB
GLUCOSE UR STRIP-MCNC: ABNORMAL MG/DL
PROT UR STRIP-MCNC: ABNORMAL MG/DL

## 2022-11-22 PROCEDURE — 0502F SUBSEQUENT PRENATAL CARE: CPT | Performed by: OBSTETRICS & GYNECOLOGY

## 2022-11-28 NOTE — PROGRESS NOTES
OB FOLLOW UP      Chief Complaint   Patient presents with   • Routine Prenatal Visit     Subjective:   • No complaints    Objective:  /81   Wt 72.1 kg (159 lb)   LMP 2022   BMI 27.29 kg/m²  11.8 kg (26 lb)  Uterine Size: size equals dates  FHT: 110-160 BPM    See OB flow for edema, cvx exam if performed, and Upro/Uglu    Assessment and Plan:  38w0d   Reassuring pregnancy progress.  Questions answered.  Diagnoses and all orders for this visit:    1. Supervision of other normal pregnancy, antepartum (Primary)  Overview:  DENNIS final:  by LMP and 8-week ultrasound    Nips: Neg    COVID: Hx of COVID x2 pre preg.  Recommended vaccine, declines  Tdap:  S/p Rx, recommended  Flu: vaccinated     Sterlization?:  No    Anatomy: Suboptimal profile and cranial structures.  Incomplete anatomy.  Posterior placenta.  Follow up: Small BPD and 2nd percentile.  Overall EFW at the 25th percentile.  AC 28th percentile.  Normal anatomy.  Follow-up 22-2 pounds 12 ounces, 56%, BPD 28%, HC 34%, AC 53%, within normal limits    Problem list /plan:  History of  (fetal intol to labor)-desires repeat    39+1 RCS AP [place]  History of HSV, oral- Continue Valtrex 1000mg daily    Orders:  -     POC Urinalysis Dipstick    Counseling:    • OB precautions, leaking, VB, frances epps vs PTL/Labor  • FKC  • Continue PNV.  Importance of healthy eating and staying active.    Return in about 1 week (around 2022) for FU OB.            Akua Be, DO  2022    [facility] [place] [facility]  [address] [place] [address]  Dept: [phone]  Dept Fax: [phone]  Loc: [phone]  Loc Fax: [phone]

## 2022-11-29 ENCOUNTER — ROUTINE PRENATAL (OUTPATIENT)
Dept: OBSTETRICS AND GYNECOLOGY | Facility: CLINIC | Age: 27
End: 2022-11-29

## 2022-11-29 VITALS — WEIGHT: 159 LBS | SYSTOLIC BLOOD PRESSURE: 124 MMHG | DIASTOLIC BLOOD PRESSURE: 81 MMHG | BODY MASS INDEX: 27.29 KG/M2

## 2022-11-29 DIAGNOSIS — Z34.80 SUPERVISION OF OTHER NORMAL PREGNANCY, ANTEPARTUM: Primary | ICD-10-CM

## 2022-11-29 LAB
GLUCOSE UR STRIP-MCNC: NEGATIVE MG/DL
PROT UR STRIP-MCNC: NEGATIVE MG/DL

## 2022-11-29 PROCEDURE — 0502F SUBSEQUENT PRENATAL CARE: CPT | Performed by: OBSTETRICS & GYNECOLOGY

## 2022-12-05 NOTE — PROGRESS NOTES
OB FOLLOW UP      Chief Complaint   Patient presents with   • Routine Prenatal Visit     Subjective:   • No complaints    Objective:  /76   Wt 71.8 kg (158 lb 3.2 oz)   LMP 2022   BMI 27.15 kg/m²  11.4 kg (25 lb 3.2 oz)  Uterine Size: size equals dates  FHT: 110-160 BPM    See OB flow for edema, cvx exam if performed, and Upro/Uglu    Assessment and Plan:  39w0d   Reassuring pregnancy progress.  Questions answered.  Diagnoses and all orders for this visit:    1. Supervision of other normal pregnancy, antepartum (Primary)  Overview:  DENNIS final:  by LMP and 8-week ultrasound    Nips: Neg    COVID: Hx of COVID x2 pre preg.  Recommended vaccine, declines  Tdap:  Recommended  Flu: vaccinated     Sterlization?:  No    Anatomy: Suboptimal profile and cranial structures.  Incomplete anatomy.  Posterior placenta.  Follow up: Small BPD and 2nd percentile.  Overall EFW at the 25th percentile.  AC 28th percentile.  Normal anatomy.  Follow-up 22-2 pounds 12 ounces, 56%, BPD 28%, HC 34%, AC 53%, within normal limits    Problem list /plan:  History of  (fetal intol to labor)-desires repeat    39+1 RCS AP [place]  History of HSV, oral- Continue Valtrex 1000mg daily    Orders:  -     POC Urinalysis Dipstick    Counseling:    • OB precautions, leaking, VB, frances epps vs PTL/Labor  • FKC  • CS reviewed in detail.  All history reviewed and updated.  Preop exam performed.  See separate scanned in counseling note.  All questions answered.  She desires to proceed as planned.   • Continue PNV.  Importance of healthy eating and staying active.    Return in about 5 weeks (around 1/10/2023) for Postpartum FU.            Akua Be, DO  2022    [facility] [place] [facility]  [address] [place] [address]  Dept: [phone]  Dept Fax: [phone]  Loc: [phone]  Loc Fax: [phone]

## 2022-12-06 ENCOUNTER — ROUTINE PRENATAL (OUTPATIENT)
Dept: OBSTETRICS AND GYNECOLOGY | Facility: CLINIC | Age: 27
End: 2022-12-06

## 2022-12-06 VITALS — SYSTOLIC BLOOD PRESSURE: 116 MMHG | BODY MASS INDEX: 27.15 KG/M2 | WEIGHT: 158.2 LBS | DIASTOLIC BLOOD PRESSURE: 76 MMHG

## 2022-12-06 DIAGNOSIS — Z34.80 SUPERVISION OF OTHER NORMAL PREGNANCY, ANTEPARTUM: Primary | ICD-10-CM

## 2022-12-06 LAB
GLUCOSE UR STRIP-MCNC: NEGATIVE MG/DL
PROT UR STRIP-MCNC: NEGATIVE MG/DL

## 2022-12-06 PROCEDURE — 0502F SUBSEQUENT PRENATAL CARE: CPT | Performed by: OBSTETRICS & GYNECOLOGY

## 2022-12-06 NOTE — H&P
OB HISTORY AND PHYSICAL      SUBJECTIVE:    27 y.o. female  currently at 39w0d [facility] complicated by:      Patient Active Problem List    Diagnosis    • Fetal size inconsistent with dates [O26.849]    • Herpes labialis [B00.1]    • Supervision of other normal pregnancy, antepartum [Z34.80]    • Previous  section [Z98.891]        CC/HPI:  Presents with Scheduled CS.     ROS: No leaking fluid, No vaginal bleeding, No contractions and Adequate FM    Past OB History:   OB History    Para Term  AB Living   3 1 1   1 1   SAB IAB Ectopic Molar Multiple Live Births   1         1      # Outcome Date GA Lbr Carl/2nd Weight Sex Delivery Anes PTL Lv   3 Current            2 2021           1 Term 2019 38w0d  3090 g (6 lb 13 oz) F CS-Unspec   DOMINGO        Prenatal Labs:    External Prenatal Results     Pregnancy Outside Results - Transcribed From Office Records - See Scanned Records For Details     Test Value Date Time    ABO  O  22 1010    Rh  Positive  22 1010    Antibody Screen  Negative  22 1010    Varicella IgG       Rubella  1.07 index 22 1010    Hgb  11.8 g/dL 22 1527       14.5 g/dL 22 1010    Hct  34.9 % 22 1527       43.4 % 22 1010    Glucose Fasting GTT       Glucose Tolerance Test 1 hour       Glucose Tolerance Test 3 hour       Gonorrhea (discrete)  Negative  22 0954    Chlamydia (discrete)  Negative  22 0954    RPR  Non-Reactive  22 1010    VDRL       Syphilis Antibody       HBsAg  Non-Reactive  22 1010    Herpes Simplex Virus PCR       Herpes Simplex VIrus Culture       HIV  Non-Reactive  22 1010    Hep C RNA Quant PCR       Hep C Antibody  Non-Reactive  22 1010    AFP       Group B Strep  No Group B Streptococcus isolated  11/15/22 1600    GBS Susceptibility to Clindamycin       GBS Susceptibility to Erythromycin       Fetal Fibronectin       Genetic Testing, Maternal Blood              PMHx:    No past  medical history on file.    Medications:  Prenatal Vit-Fe Fumarate-FA and valACYclovir    Allergies:  Allergies   Allergen Reactions   • Penicillins Rash     As a child, rash.  Ok for cephalosporin       PSHx:    Past Surgical History:   Procedure Laterality Date   •  SECTION      [facility]   • DILATATION AND CURETTAGE         Social History:    reports that she has never smoked. She has never used smokeless tobacco. She reports that she does not drink alcohol and does not use drugs.    Family History: Non contributory    Immunizations: See prenatal record for Tdap, Flu, Covid and/or other vaccinations    PHYSICAL EXAM:  BP: (116)/(76) 116/76  General- NAD, alert and oriented, appropriate  Psych- normal mood, good memory  Cardiovascular- Regular rhythm, no murnurs  Respiratory- CTA to bases, no wheezes  Abdomen- Gravid, non tender  Fundus-  Non tender.  Size: consistent with dates  Presentation- VTX  Extremities/DTRs- No edema, bilaterally equal, no signs of DVT    Fetal HR: Doptones 110-160, see last OV note  Contractions: Not complaining of any contractions at last OV      ASSESSMENT:  39w0d  Scheduled CS  Declines   Lab Results   Component Value Date    STREPGPB No Group B Streptococcus isolated 11/15/2022         PLAN:  Admit  Delivery:     Plan of care [facility] course, R/B/A/potential SE, suspected length have been reviewed with patient and any family or friends present, questions answered to her/his/their satisfaction.  Pt desires to proceed as above.    Counseling:The patient was counseled on the risks, benefits and alternatives of a .  Risks reviewed, but are not limited to: anesthesia, bleeding, transfusion, hysterectomy, infection, damage to organs, reoperation, wound separation, blood clots, and death.  She declines the alternatives and desires a .  All her questions have been answered to her satisfaction and she desires to  proceed.          Electronically signed by Akua Be DO, 12/06/22, 4:34 PM EST.    [facility]  [address] [place] [address] AVE  [place] [address]  Dept: [phone]  Loc: [phone]

## 2022-12-07 ENCOUNTER — ANESTHESIA EVENT (OUTPATIENT)
Dept: LABOR AND DELIVERY | Facility: HOSPITAL | Age: 27
End: 2022-12-07

## 2022-12-07 ENCOUNTER — HOSPITAL ENCOUNTER (INPATIENT)
Facility: HOSPITAL | Age: 27
LOS: 2 days | Discharge: HOME | End: 2022-12-09
Attending: OBSTETRICS & GYNECOLOGY | Admitting: OBSTETRICS & GYNECOLOGY

## 2022-12-07 ENCOUNTER — ANESTHESIA (OUTPATIENT)
Dept: LABOR AND DELIVERY | Facility: HOSPITAL | Age: 27
End: 2022-12-07

## 2022-12-07 DIAGNOSIS — Z98.891 H/O: C-SECTION: Primary | ICD-10-CM

## 2022-12-07 PROBLEM — B00.1 HERPES LABIALIS: Status: RESOLVED | Noted: 2022-07-05 | Resolved: 2022-12-07

## 2022-12-07 PROBLEM — O26.849 FETAL SIZE INCONSISTENT WITH DATES: Status: RESOLVED | Noted: 2022-08-29 | Resolved: 2022-12-07

## 2022-12-07 PROBLEM — Z34.80 SUPERVISION OF OTHER NORMAL PREGNANCY, ANTEPARTUM: Status: RESOLVED | Noted: 2022-04-14 | Resolved: 2022-12-07

## 2022-12-07 LAB
ABO GROUP BLD: NORMAL
BASE EXCESS BLDCOA CALC-SCNC: -7.1 MMOL/L (ref -2–2)
BASE EXCESS BLDCOV CALC-SCNC: -3.3 MMOL/L (ref -2–2)
BLD GP AB SCN SERPL QL: NEGATIVE
DEPRECATED RDW RBC AUTO: 44.1 FL (ref 37–54)
ERYTHROCYTE [DISTWIDTH] IN BLOOD BY AUTOMATED COUNT: 13.3 % (ref 12.3–15.4)
HCO3 BLDCOA-SCNC: 19.9 MMOL/L
HCO3 BLDCOV-SCNC: 22.2 MMOL/L
HCT VFR BLD AUTO: 32.9 % (ref 34–46.6)
HGB BLD-MCNC: 11.1 G/DL (ref 12–15.9)
MCH RBC QN AUTO: 31.3 PG (ref 26.6–33)
MCHC RBC AUTO-ENTMCNC: 33.7 G/DL (ref 31.5–35.7)
MCV RBC AUTO: 92.7 FL (ref 79–97)
PCO2 BLDCOA: 45.6 MMHG (ref 33–49)
PCO2 BLDCOV: 41.7 MM HG (ref 28–40)
PH BLDCOA: 7.26 PH UNITS (ref 7.21–7.31)
PH BLDCOV: 7.34 PH UNITS (ref 7.31–7.37)
PLATELET # BLD AUTO: 263 10*3/MM3 (ref 140–450)
PMV BLD AUTO: 10.4 FL (ref 6–12)
PO2 BLDCOA: <40.5 MMHG
PO2 BLDCOV: <40.5 MM HG (ref 21–31)
RBC # BLD AUTO: 3.55 10*6/MM3 (ref 3.77–5.28)
RH BLD: POSITIVE
T&S EXPIRATION DATE: NORMAL
WBC NRBC COR # BLD: 7.76 10*3/MM3 (ref 3.4–10.8)

## 2022-12-07 PROCEDURE — 85027 COMPLETE CBC AUTOMATED: CPT | Performed by: OBSTETRICS & GYNECOLOGY

## 2022-12-07 PROCEDURE — 25010000002 OXYTOCIN PER 10 UNITS: Performed by: NURSE ANESTHETIST, CERTIFIED REGISTERED

## 2022-12-07 PROCEDURE — 0 MORPHINE PER 10 MG: Performed by: ANESTHESIOLOGY

## 2022-12-07 PROCEDURE — 59025 FETAL NON-STRESS TEST: CPT

## 2022-12-07 PROCEDURE — 86901 BLOOD TYPING SEROLOGIC RH(D): CPT | Performed by: OBSTETRICS & GYNECOLOGY

## 2022-12-07 PROCEDURE — 82803 BLOOD GASES ANY COMBINATION: CPT | Performed by: OBSTETRICS & GYNECOLOGY

## 2022-12-07 PROCEDURE — 25010000002 ONDANSETRON PER 1 MG: Performed by: NURSE ANESTHETIST, CERTIFIED REGISTERED

## 2022-12-07 PROCEDURE — 86850 RBC ANTIBODY SCREEN: CPT | Performed by: OBSTETRICS & GYNECOLOGY

## 2022-12-07 PROCEDURE — 25010000002 KETOROLAC TROMETHAMINE PER 15 MG: Performed by: OBSTETRICS & GYNECOLOGY

## 2022-12-07 PROCEDURE — 51702 INSERT TEMP BLADDER CATH: CPT

## 2022-12-07 PROCEDURE — 59510 CESAREAN DELIVERY: CPT | Performed by: OBSTETRICS & GYNECOLOGY

## 2022-12-07 PROCEDURE — 25010000002 CEFAZOLIN IN DEXTROSE 2-4 GM/100ML-% SOLUTION: Performed by: OBSTETRICS & GYNECOLOGY

## 2022-12-07 PROCEDURE — 86900 BLOOD TYPING SEROLOGIC ABO: CPT | Performed by: OBSTETRICS & GYNECOLOGY

## 2022-12-07 RX ORDER — KETOROLAC TROMETHAMINE 15 MG/ML
15 INJECTION, SOLUTION INTRAMUSCULAR; INTRAVENOUS EVERY 6 HOURS
Status: DISPENSED | OUTPATIENT
Start: 2022-12-07 | End: 2022-12-08

## 2022-12-07 RX ORDER — BUPIVACAINE HYDROCHLORIDE 7.5 MG/ML
INJECTION, SOLUTION EPIDURAL; RETROBULBAR
Status: COMPLETED | OUTPATIENT
Start: 2022-12-07 | End: 2022-12-07

## 2022-12-07 RX ORDER — ACETAMINOPHEN 500 MG
1000 TABLET ORAL EVERY 6 HOURS
Status: COMPLETED | OUTPATIENT
Start: 2022-12-07 | End: 2022-12-08

## 2022-12-07 RX ORDER — NALOXONE HCL 0.4 MG/ML
0.4 VIAL (ML) INJECTION
Status: DISCONTINUED | OUTPATIENT
Start: 2022-12-07 | End: 2022-12-09 | Stop reason: HOSPADM

## 2022-12-07 RX ORDER — MISOPROSTOL 200 UG/1
800 TABLET ORAL AS NEEDED
Status: DISCONTINUED | OUTPATIENT
Start: 2022-12-07 | End: 2022-12-07

## 2022-12-07 RX ORDER — MEPERIDINE HYDROCHLORIDE 25 MG/ML
12.5 INJECTION INTRAMUSCULAR; INTRAVENOUS; SUBCUTANEOUS
Status: DISCONTINUED | OUTPATIENT
Start: 2022-12-07 | End: 2022-12-07 | Stop reason: HOSPADM

## 2022-12-07 RX ORDER — CEFAZOLIN SODIUM 2 G/100ML
2 INJECTION, SOLUTION INTRAVENOUS ONCE
Status: COMPLETED | OUTPATIENT
Start: 2022-12-07 | End: 2022-12-07

## 2022-12-07 RX ORDER — CALCIUM CARBONATE 200(500)MG
1 TABLET,CHEWABLE ORAL EVERY 4 HOURS PRN
Status: DISCONTINUED | OUTPATIENT
Start: 2022-12-07 | End: 2022-12-09 | Stop reason: HOSPADM

## 2022-12-07 RX ORDER — SODIUM CHLORIDE 0.9 % (FLUSH) 0.9 %
3 SYRINGE (ML) INJECTION EVERY 12 HOURS SCHEDULED
Status: DISCONTINUED | OUTPATIENT
Start: 2022-12-07 | End: 2022-12-07

## 2022-12-07 RX ORDER — PHENYLEPHRINE HCL IN 0.9% NACL 1 MG/10 ML
SYRINGE (ML) INTRAVENOUS AS NEEDED
Status: DISCONTINUED | OUTPATIENT
Start: 2022-12-07 | End: 2022-12-07 | Stop reason: SURG

## 2022-12-07 RX ORDER — HYDROMORPHONE HCL 110MG/55ML
0.5 PATIENT CONTROLLED ANALGESIA SYRINGE INTRAVENOUS
Status: DISCONTINUED | OUTPATIENT
Start: 2022-12-07 | End: 2022-12-07 | Stop reason: HOSPADM

## 2022-12-07 RX ORDER — ONDANSETRON 2 MG/ML
INJECTION INTRAMUSCULAR; INTRAVENOUS AS NEEDED
Status: DISCONTINUED | OUTPATIENT
Start: 2022-12-07 | End: 2022-12-07 | Stop reason: SURG

## 2022-12-07 RX ORDER — FAMOTIDINE 20 MG/1
20 TABLET, FILM COATED ORAL ONCE AS NEEDED
Status: DISCONTINUED | OUTPATIENT
Start: 2022-12-07 | End: 2022-12-07 | Stop reason: HOSPADM

## 2022-12-07 RX ORDER — FAMOTIDINE 10 MG/ML
20 INJECTION, SOLUTION INTRAVENOUS ONCE AS NEEDED
Status: DISCONTINUED | OUTPATIENT
Start: 2022-12-07 | End: 2022-12-07

## 2022-12-07 RX ORDER — SIMETHICONE 80 MG
80 TABLET,CHEWABLE ORAL 4 TIMES DAILY PRN
Status: DISCONTINUED | OUTPATIENT
Start: 2022-12-07 | End: 2022-12-09 | Stop reason: HOSPADM

## 2022-12-07 RX ORDER — OXYCODONE HYDROCHLORIDE 5 MG/1
5 TABLET ORAL EVERY 4 HOURS PRN
Status: DISCONTINUED | OUTPATIENT
Start: 2022-12-07 | End: 2022-12-09 | Stop reason: HOSPADM

## 2022-12-07 RX ORDER — GLYCOPYRROLATE 0.2 MG/ML
INJECTION INTRAMUSCULAR; INTRAVENOUS AS NEEDED
Status: DISCONTINUED | OUTPATIENT
Start: 2022-12-07 | End: 2022-12-07 | Stop reason: SURG

## 2022-12-07 RX ORDER — IBUPROFEN 600 MG/1
600 TABLET ORAL EVERY 6 HOURS
Status: DISCONTINUED | OUTPATIENT
Start: 2022-12-08 | End: 2022-12-09 | Stop reason: HOSPADM

## 2022-12-07 RX ORDER — MORPHINE SULFATE 0.5 MG/ML
INJECTION, SOLUTION EPIDURAL; INTRATHECAL; INTRAVENOUS
Status: COMPLETED | OUTPATIENT
Start: 2022-12-07 | End: 2022-12-07

## 2022-12-07 RX ORDER — METHYLERGONOVINE MALEATE 0.2 MG/ML
200 INJECTION INTRAVENOUS ONCE AS NEEDED
Status: DISCONTINUED | OUTPATIENT
Start: 2022-12-07 | End: 2022-12-07

## 2022-12-07 RX ORDER — DOCUSATE SODIUM 100 MG/1
100 CAPSULE, LIQUID FILLED ORAL DAILY
Status: DISCONTINUED | OUTPATIENT
Start: 2022-12-07 | End: 2022-12-09 | Stop reason: HOSPADM

## 2022-12-07 RX ORDER — ALUMINA, MAGNESIA, AND SIMETHICONE 2400; 2400; 240 MG/30ML; MG/30ML; MG/30ML
15 SUSPENSION ORAL EVERY 4 HOURS PRN
Status: DISCONTINUED | OUTPATIENT
Start: 2022-12-07 | End: 2022-12-09 | Stop reason: HOSPADM

## 2022-12-07 RX ORDER — ACETAMINOPHEN 500 MG
1000 TABLET ORAL ONCE
Status: COMPLETED | OUTPATIENT
Start: 2022-12-07 | End: 2022-12-07

## 2022-12-07 RX ORDER — KETOROLAC TROMETHAMINE 30 MG/ML
30 INJECTION, SOLUTION INTRAMUSCULAR; INTRAVENOUS ONCE
Status: COMPLETED | OUTPATIENT
Start: 2022-12-07 | End: 2022-12-07

## 2022-12-07 RX ORDER — OXYCODONE HYDROCHLORIDE 5 MG/1
10 TABLET ORAL EVERY 4 HOURS PRN
Status: DISCONTINUED | OUTPATIENT
Start: 2022-12-07 | End: 2022-12-09 | Stop reason: HOSPADM

## 2022-12-07 RX ORDER — SODIUM CHLORIDE, SODIUM LACTATE, POTASSIUM CHLORIDE, CALCIUM CHLORIDE 600; 310; 30; 20 MG/100ML; MG/100ML; MG/100ML; MG/100ML
150 INJECTION, SOLUTION INTRAVENOUS CONTINUOUS
Status: DISCONTINUED | OUTPATIENT
Start: 2022-12-07 | End: 2022-12-07

## 2022-12-07 RX ORDER — HYDROMORPHONE HCL 110MG/55ML
0.25 PATIENT CONTROLLED ANALGESIA SYRINGE INTRAVENOUS
Status: DISCONTINUED | OUTPATIENT
Start: 2022-12-07 | End: 2022-12-07 | Stop reason: HOSPADM

## 2022-12-07 RX ORDER — SODIUM CHLORIDE 0.9 % (FLUSH) 0.9 %
10 SYRINGE (ML) INJECTION AS NEEDED
Status: DISCONTINUED | OUTPATIENT
Start: 2022-12-07 | End: 2022-12-07

## 2022-12-07 RX ORDER — FAMOTIDINE 10 MG/ML
20 INJECTION, SOLUTION INTRAVENOUS ONCE AS NEEDED
Status: DISCONTINUED | OUTPATIENT
Start: 2022-12-07 | End: 2022-12-07 | Stop reason: HOSPADM

## 2022-12-07 RX ORDER — ONDANSETRON 4 MG/1
4 TABLET, FILM COATED ORAL EVERY 6 HOURS PRN
Status: DISCONTINUED | OUTPATIENT
Start: 2022-12-07 | End: 2022-12-07 | Stop reason: HOSPADM

## 2022-12-07 RX ORDER — KETOROLAC TROMETHAMINE 15 MG/ML
15 INJECTION, SOLUTION INTRAMUSCULAR; INTRAVENOUS EVERY 6 HOURS
Status: DISCONTINUED | OUTPATIENT
Start: 2022-12-07 | End: 2022-12-07

## 2022-12-07 RX ORDER — OXYTOCIN/0.9 % SODIUM CHLORIDE 30/500 ML
999 PLASTIC BAG, INJECTION (ML) INTRAVENOUS ONCE
Status: DISCONTINUED | OUTPATIENT
Start: 2022-12-07 | End: 2022-12-07 | Stop reason: HOSPADM

## 2022-12-07 RX ORDER — ONDANSETRON 4 MG/1
4 TABLET, FILM COATED ORAL EVERY 6 HOURS PRN
Status: DISCONTINUED | OUTPATIENT
Start: 2022-12-07 | End: 2022-12-09 | Stop reason: HOSPADM

## 2022-12-07 RX ORDER — DIPHENHYDRAMINE HYDROCHLORIDE 50 MG/ML
12.5 INJECTION INTRAMUSCULAR; INTRAVENOUS EVERY 6 HOURS PRN
Status: ACTIVE | OUTPATIENT
Start: 2022-12-07 | End: 2022-12-08

## 2022-12-07 RX ORDER — ACETAMINOPHEN 325 MG/1
650 TABLET ORAL EVERY 6 HOURS
Status: DISCONTINUED | OUTPATIENT
Start: 2022-12-08 | End: 2022-12-09 | Stop reason: HOSPADM

## 2022-12-07 RX ORDER — TRISODIUM CITRATE DIHYDRATE AND CITRIC ACID MONOHYDRATE 500; 334 MG/5ML; MG/5ML
30 SOLUTION ORAL ONCE
Status: COMPLETED | OUTPATIENT
Start: 2022-12-07 | End: 2022-12-07

## 2022-12-07 RX ORDER — LIDOCAINE HYDROCHLORIDE 10 MG/ML
5 INJECTION, SOLUTION EPIDURAL; INFILTRATION; INTRACAUDAL; PERINEURAL AS NEEDED
Status: DISCONTINUED | OUTPATIENT
Start: 2022-12-07 | End: 2022-12-07

## 2022-12-07 RX ORDER — ONDANSETRON 2 MG/ML
4 INJECTION INTRAMUSCULAR; INTRAVENOUS EVERY 6 HOURS PRN
Status: DISCONTINUED | OUTPATIENT
Start: 2022-12-07 | End: 2022-12-07 | Stop reason: HOSPADM

## 2022-12-07 RX ORDER — IBUPROFEN 600 MG/1
600 TABLET ORAL EVERY 6 HOURS
Status: DISCONTINUED | OUTPATIENT
Start: 2022-12-08 | End: 2022-12-07

## 2022-12-07 RX ORDER — OXYTOCIN 10 [USP'U]/ML
INJECTION, SOLUTION INTRAMUSCULAR; INTRAVENOUS AS NEEDED
Status: DISCONTINUED | OUTPATIENT
Start: 2022-12-07 | End: 2022-12-07 | Stop reason: SURG

## 2022-12-07 RX ORDER — MISOPROSTOL 100 UG/1
TABLET ORAL AS NEEDED
Status: DISCONTINUED | OUTPATIENT
Start: 2022-12-07 | End: 2022-12-09 | Stop reason: HOSPADM

## 2022-12-07 RX ORDER — MORPHINE SULFATE 0.5 MG/ML
INJECTION, SOLUTION EPIDURAL; INTRATHECAL; INTRAVENOUS AS NEEDED
Status: DISCONTINUED | OUTPATIENT
Start: 2022-12-07 | End: 2022-12-07 | Stop reason: SURG

## 2022-12-07 RX ORDER — NALOXONE HCL 0.4 MG/ML
0.4 VIAL (ML) INJECTION ONCE AS NEEDED
Status: ACTIVE | OUTPATIENT
Start: 2022-12-07 | End: 2022-12-08

## 2022-12-07 RX ORDER — OXYTOCIN/0.9 % SODIUM CHLORIDE 30/500 ML
250 PLASTIC BAG, INJECTION (ML) INTRAVENOUS CONTINUOUS
Status: ACTIVE | OUTPATIENT
Start: 2022-12-07 | End: 2022-12-07

## 2022-12-07 RX ORDER — HYDROCORTISONE 25 MG/G
CREAM TOPICAL 3 TIMES DAILY PRN
Status: DISCONTINUED | OUTPATIENT
Start: 2022-12-07 | End: 2022-12-09 | Stop reason: HOSPADM

## 2022-12-07 RX ORDER — DIPHENHYDRAMINE HCL 25 MG
25 CAPSULE ORAL EVERY 6 HOURS PRN
Status: ACTIVE | OUTPATIENT
Start: 2022-12-07 | End: 2022-12-08

## 2022-12-07 RX ORDER — ONDANSETRON 2 MG/ML
4 INJECTION INTRAMUSCULAR; INTRAVENOUS EVERY 6 HOURS PRN
Status: DISCONTINUED | OUTPATIENT
Start: 2022-12-07 | End: 2022-12-09 | Stop reason: HOSPADM

## 2022-12-07 RX ADMIN — SODIUM CHLORIDE, POTASSIUM CHLORIDE, SODIUM LACTATE AND CALCIUM CHLORIDE: 600; 310; 30; 20 INJECTION, SOLUTION INTRAVENOUS at 10:23

## 2022-12-07 RX ADMIN — Medication 200 MCG: at 11:06

## 2022-12-07 RX ADMIN — Medication 300 MCG: at 11:12

## 2022-12-07 RX ADMIN — Medication 200 MCG: at 10:53

## 2022-12-07 RX ADMIN — BUPIVACAINE HYDROCHLORIDE 1.6 ML: 7.5 INJECTION, SOLUTION EPIDURAL; RETROBULBAR at 10:26

## 2022-12-07 RX ADMIN — Medication 100 MCG: at 11:03

## 2022-12-07 RX ADMIN — ONDANSETRON 8 MG: 2 INJECTION INTRAMUSCULAR; INTRAVENOUS at 10:20

## 2022-12-07 RX ADMIN — OXYTOCIN 20 UNITS: 10 INJECTION, SOLUTION INTRAMUSCULAR; INTRAVENOUS at 10:53

## 2022-12-07 RX ADMIN — ACETAMINOPHEN 1000 MG: 500 TABLET ORAL at 09:00

## 2022-12-07 RX ADMIN — Medication 200 MCG: at 10:33

## 2022-12-07 RX ADMIN — ACETAMINOPHEN 1000 MG: 500 TABLET ORAL at 20:53

## 2022-12-07 RX ADMIN — SODIUM CHLORIDE, POTASSIUM CHLORIDE, SODIUM LACTATE AND CALCIUM CHLORIDE: 600; 310; 30; 20 INJECTION, SOLUTION INTRAVENOUS at 10:21

## 2022-12-07 RX ADMIN — MORPHINE SULFATE 4.8 MG: 0.5 INJECTION, SOLUTION EPIDURAL; INTRATHECAL; INTRAVENOUS at 10:58

## 2022-12-07 RX ADMIN — KETOROLAC TROMETHAMINE 30 MG: 30 INJECTION, SOLUTION INTRAMUSCULAR; INTRAVENOUS at 12:52

## 2022-12-07 RX ADMIN — MORPHINE SULFATE 0.2 MG: 0.5 INJECTION, SOLUTION EPIDURAL; INTRATHECAL; INTRAVENOUS at 10:26

## 2022-12-07 RX ADMIN — ACETAMINOPHEN 1000 MG: 500 TABLET ORAL at 16:32

## 2022-12-07 RX ADMIN — SODIUM CITRATE AND CITRIC ACID MONOHYDRATE 30 ML: 500; 334 SOLUTION ORAL at 09:00

## 2022-12-07 RX ADMIN — GLYCOPYRROLATE 0.2 MG: 0.2 INJECTION INTRAMUSCULAR; INTRAVENOUS at 10:33

## 2022-12-07 RX ADMIN — SODIUM CHLORIDE, POTASSIUM CHLORIDE, SODIUM LACTATE AND CALCIUM CHLORIDE 1000 ML: 600; 310; 30; 20 INJECTION, SOLUTION INTRAVENOUS at 19:40

## 2022-12-07 RX ADMIN — KETOROLAC TROMETHAMINE 15 MG: 15 INJECTION, SOLUTION INTRAMUSCULAR; INTRAVENOUS at 18:41

## 2022-12-07 RX ADMIN — CEFAZOLIN SODIUM 2 G: 2 INJECTION, SOLUTION INTRAVENOUS at 08:59

## 2022-12-07 NOTE — DISCHARGE INSTRUCTIONS
DR. WALLS'S POSTOP  DISCHARGE PRECAUTIONS and Answers to FAQs     NO SEX or tampons for SIX weeks.     NO DRIVING for TWO weeks. or while taking narcotic pain medications.     NO TUB BATH or POOL for FOUR weeks, shower only.       NO LIFTING more than 20 pounds for 2 week(s).     STAPLES (if present):  follow up at the office in the next 3-7 days to have them removed if they were not removed before discharge.  If your staples were removed already and steri-strips placed (or you just had steri-strips) REMOVE YOUR STERI STRIPS in TEN DAYS.      INCISION CARE:   WASH DAILY in the shower with soap and water (any type of soap is fine, it does not need to be antibacterial soap).  Look (or have a family member/friend look) at your incision EVERY DAY when you get home.  Keeping the incision DRY is extremely important.  Continue to keep a clean, dry wash cloth (to help wick away moisture) on your incision for 10 days.  Change out the wash cloth frequently (approximately every 2-8 hrs as needed to prevent it from getting moist).  REDNESS, PUS, increase in PAIN, FEVER or CHILLS are all reasons to be seen in our office immediately.  Go to the ER, if it is after hours or a weekend.         VAGINAL BLEEDING:  may continue on and off over the next several weeks after delivery and may increase slightly once you go home.  You should not be bleeding more than 1 large pad soaked every hour or two.  Clots (even the size of a lemon or larger) may be normal as long as the bleeding is not heavy and the clots do not continue.       FEVER or CHILLS or NOT FEELING WELL: call our office.  If the office is closed, you need to be seen in acute care or ER.       CHEST PAIN or SHORTNESS OF BREATH/AIR: you need to GO TO THE NEAREST ER or CALL 911.      SWELLING:  can increase over the next 7-10 days and then should slowly improve.  Your legs/ankles should be fairly similar in size.  A red, painful, hot, swollen leg (usually just one side)  can be a sign of a blood clot and should be evaluated immediately.  Call our office.  If it is after hours or a weekend, you must be seen IMMEDIATELY IN THE ER.      ELEVATED BLOOD PRESSURE:  you need to contact us if you are having  persistent elevated BP systolic (top number) more than 155 or diastolic (bottom number) more than 95, or a headache (not relieved with rest, hydration or over the counter pain reliever), an increase in your swelling (usually hands and face), changes in your vision (typically flashing white or black spots) or severe persistent pain in the location of the upper right side of your belly (under your right breast).  Call our office or go to ER if after hours or a weekend.     LACTATION QUESTIONS or CONCERNS?  Call [facility] Lactation support [phone].     WORK and SCHOOL TIME OFF: depends on your specific delivery type, surrounding circumstances, and your work insurance/school rules.  If you have questions, please call Teresa or Alyssa at [phone] (ext. [phone]).  Or email Teresa at [email or web address].  They will assist in required paperwork for you and/or family members.      For further QUESTIONS or CONCERNS, please call [facility] at [phone].

## 2022-12-07 NOTE — OP NOTE
OB Operative Note        Date of Service:  22     Pre-Operative Dx:   IUP at Gestational Age: 39w1d  weeks    indication: Scheduled repeat , declines     Postoperative dx:   Same as above    Procedure: Repeat LTCS    Surgeon:  Akua Be DO    Assist:  Dr. Olaf Renae, OB GYN hospitalist was responsible for performing the following activities: Retraction, Suction, Irrigation and Delivery of Fetus and their skilled assistance was necessary for the success of this case.      No surgical first assist or resident available.    Anesthesia:  Spinal  Anesthesiologist: Addison Hinds MD  CRNA: Kailyn Guan CRNA    EBL: 600 Mls    Findings: Normal uterus, Normal tubes, Normal ovaries, Normal placenta, centrally inserting cord    Infant: Gender:  female  infant    Weight:   3450 g (7 lb 9.7 oz)     APGARS:  8  @ 1 minute / 9  @ 5 minutes    Specimens:  Cord blood, cord gases    Procedure Details:  The patient was brought to the operating room and spinal anesthesia was deemed to be adequate.  She was prepped and draped in a normal sterile fashion and placed in supine position with a leftward tilt.  A Pfannenstiel skin incision was made approximately 2 fingerbreadths above the symphysis pubis and carried down to the underlying layer of fascia.  Her old skin incision was excised and discarded.  The fascia was nicked in the midline and extended laterally with Knight scissors.  The superior and inferior aspects of the fascial incision were grasped with Kocher clamps and the underlying rectus muscle was dissected off bluntly.  The midline was identified and opened in a sharp fashion with no noted damage to underlying bowel, bladder, blood vessels, or organs.  The vesicouterine peritoneum was incised and the bladder flap was created.  The lower uterine segment was incised in a transverse fashion and extended laterally in a manual fashion.  Fluid was noted to be clear.  The fetal vertex was brought  up atraumatically through the uterine incision.  The mouth and nares were bulb suctioned.  There was no nuchal cord.  The left anterior and right posterior shoulders were delivered easily.  The remainder of the body delivered.  The infant was vigorous.  The cord was clamped and cut after a delay of 60 seconds and the infant was handed off to waiting baby care.  A segment of cord was handed off to the technician for collection of cord blood and cord gases.  The placenta delivered with the assistance of fundal massage and was discarded.  The uterus was exteriorized and cleared of all clots and debris.  The uterine incision was repaired with 0 Vicryl in a running fashion.  A second imbricating stitch was placed.  Small venous plexus bleeding on the edge of the bladder flap, serosa.  Figure-of-eight stitch with 4-0 Monocryl.  Excellent hemostasis was assured.       The uterus was placed back into the pelvic cavity.  Copious irrigation was performed.  The gutters were cleared of all clot and debris.  The uterine incision was reinspected off tension and noted to be hemostatic.  Interceed was placed over the uterine incision and anterior uterus.  The parietal peritoneum was closed.  The rectus muscles were reapproximated in the midline.  The rectus muscles and fascia were noted to be hemostatic.  The fascia was closed with 0 Vicryl in a running fashion starting at the bilateral angles and to the midline.  The subcutaneous tissue was copiously irrigated and made hemostatic.  It was reapproximated using monocryl and the skin was closed with staples.  Urine was clear at the end of the procedure.     The patient tolerated the procedure well.  Instrument, lap, and needle counts were correct.  The patient received antibiotics prior to the procedure.  She was taken to the recovery room in stable condition.      Complications: None    Condition: Good    Disposition:  PACU          Electronically signed by:  Akua Be DO  12/07/22, 11:27 AM EST.

## 2022-12-07 NOTE — ANESTHESIA PROCEDURE NOTES
Spinal Block      Patient reassessed immediately prior to procedure    Patient location during procedure: OB  Start Time: 12/7/2022 10:23 AM  Stop Time: 12/7/2022 10:26 AM  Indication:at surgeon's request and post-op pain management  Performed By  CRNA/CAA: Kailyn Guan CRNA  Preanesthetic Checklist  Completed: patient identified, IV checked, risks and benefits discussed, surgical consent, monitors and equipment checked, pre-op evaluation and timeout performed  Spinal Block Prep:  Patient Position:sitting  Sterile Tech:cap, gloves, mask and sterile barriers  Prep:Chloraprep  Patient Monitoring:blood pressure monitoring, continuous pulse oximetry and EKG    Spinal Block Procedure  Approach:midline  Guidance:landmark technique and palpation technique  Location:L3-L4  Needle Type:Pencan  Needle Gauge:24 G  Placement of Spinal needle event:cerebrospinal fluid aspirated  Paresthesia: no  Fluid Appearance:clear  Medications: bupivacaine PF (MARCAINE) injection 0.75% - Intrathecal   1.6 mL - 12/7/2022 10:26:00 AM  morphine PF (DURAMORPH) injection - Intrathecal   0.2 mg - 12/7/2022 10:26:00 AM   Post Assessment  Patient Tolerance:patient tolerated the procedure well with no apparent complications  Complications no  Additional Notes  Skin infiltration with Lidocaine 1%. Introducer inserted, followed by spinal needle. + CSF return. Intrathecal marcaine and PF duramorph injected (see eMAR). Spinal needle/introducer removed. Site clean/dry/intact.

## 2022-12-07 NOTE — ANESTHESIA PREPROCEDURE EVALUATION
Anesthesia Evaluation     Patient summary reviewed and Nursing notes reviewed   no history of anesthetic complications:  NPO Solid Status: > 8 hours  NPO Liquid Status: > 2 hours           Airway   Mallampati: II  TM distance: >3 FB  Neck ROM: full  No difficulty expected  Dental      Pulmonary - negative pulmonary ROS and normal exam    breath sounds clear to auscultation  Cardiovascular - negative cardio ROS and normal exam  Exercise tolerance: good (4-7 METS)    Rhythm: regular  Rate: normal        Neuro/Psych- negative ROS  GI/Hepatic/Renal/Endo - negative ROS     Musculoskeletal (-) negative ROS    Abdominal    Substance History - negative use     OB/GYN    (+) Pregnant,         Other - negative ROS       ROS/Med Hx Other: PAT Nursing Notes unavailable.                   Anesthesia Plan    ASA 2     spinal       Anesthetic plan, risks, benefits, and alternatives have been provided, discussed and informed consent has been obtained with: patient and spouse/significant other.        CODE STATUS:    Code Status (Patient has no pulse and is not breathing): CPR (Attempt to Resuscitate)  Medical Interventions (Patient has pulse or is breathing): Full

## 2022-12-07 NOTE — DISCHARGE SUMMARY
OB Discharge Summary        Admit Date:  2022  Date of Delivery: 2022   Discharge Date: 2022      Reason for Admission/Chief Complaint: Scheduled     Final Diagnosis:  39+1, scheduled repeat    history of HSV  To do at FU: Routine postpartum, vaccinations    Antepartum:  Prenatal care is complicated by:  See above Final Diagnosis for list    Intrapartum/Delivery:  OB Surgeon:  Dr. Akua Be DO  Anesthesia: Spinal  Delivery Type:  LTCS    Feeding method: Breast    Infant: female  infant; [place]    Weight: 3450 g (7 lb 9.7 oz)      APGARS: 8  @ 1 minute / 9  @ 5 minutes    Hospital Course/Significant Findings:  Scheduled repeat .  Uncomplicated CS and postop.    Results from last 7 days   Lab Units 22  0525 22  0850   WBC 10*3/mm3 8.53 7.76   HEMOGLOBIN g/dL 10.0* 11.1*   HEMATOCRIT % 29.7* 32.9*   PLATELETS 10*3/mm3 210 263       Discharge:         Discharge Medications        New Medications        Instructions Start Date   acetaminophen 325 MG tablet  Commonly known as: Tylenol   650 mg, Oral, Every 6 Hours PRN      docusate sodium 100 MG capsule  Commonly known as: Colace   100 mg, Oral, 2 Times Daily PRN      ferrous sulfate 325 (65 FE) MG tablet   325 mg, Oral, Every Other Day      ibuprofen 800 MG tablet  Commonly known as: ADVIL,MOTRIN   800 mg, Oral, Every 8 Hours PRN      oxyCODONE 5 MG immediate release tablet  Commonly known as: Roxicodone   5 mg, Oral, Every 4 Hours PRN             Continue These Medications        Instructions Start Date   PRENATAL PO   Oral, Daily             Stop These Medications      valACYclovir 1000 MG tablet  Commonly known as: VALTREX                Disposition: Home  Diet: Regular    Pelvic Rest: 6 weeks    Condition at discharge: Good    Follow up with: Dr. Akua Be DO or provider of her choice    Follow up in: 5 weeks      Complications: None      Signature: Electronically signed by Akua Be DO, 22,  12:45 PM EST.        [facility]  [address] [place] [address]  Dept: [phone]  Loc: [phone]

## 2022-12-08 LAB
BASOPHILS # BLD AUTO: 0.03 10*3/MM3 (ref 0–0.2)
BASOPHILS NFR BLD AUTO: 0.4 % (ref 0–1.5)
DEPRECATED RDW RBC AUTO: 45.4 FL (ref 37–54)
EOSINOPHIL # BLD AUTO: 0.08 10*3/MM3 (ref 0–0.4)
EOSINOPHIL NFR BLD AUTO: 0.9 % (ref 0.3–6.2)
ERYTHROCYTE [DISTWIDTH] IN BLOOD BY AUTOMATED COUNT: 13.3 % (ref 12.3–15.4)
HCT VFR BLD AUTO: 29.7 % (ref 34–46.6)
HGB BLD-MCNC: 10 G/DL (ref 12–15.9)
IMM GRANULOCYTES # BLD AUTO: 0.04 10*3/MM3 (ref 0–0.05)
IMM GRANULOCYTES NFR BLD AUTO: 0.5 % (ref 0–0.5)
LYMPHOCYTES # BLD AUTO: 1.84 10*3/MM3 (ref 0.7–3.1)
LYMPHOCYTES NFR BLD AUTO: 21.6 % (ref 19.6–45.3)
MCH RBC QN AUTO: 31.6 PG (ref 26.6–33)
MCHC RBC AUTO-ENTMCNC: 33.7 G/DL (ref 31.5–35.7)
MCV RBC AUTO: 94 FL (ref 79–97)
MONOCYTES # BLD AUTO: 0.77 10*3/MM3 (ref 0.1–0.9)
MONOCYTES NFR BLD AUTO: 9 % (ref 5–12)
NEUTROPHILS NFR BLD AUTO: 5.77 10*3/MM3 (ref 1.7–7)
NEUTROPHILS NFR BLD AUTO: 67.6 % (ref 42.7–76)
NRBC BLD AUTO-RTO: 0 /100 WBC (ref 0–0.2)
PLATELET # BLD AUTO: 210 10*3/MM3 (ref 140–450)
PMV BLD AUTO: 10.2 FL (ref 6–12)
RBC # BLD AUTO: 3.16 10*6/MM3 (ref 3.77–5.28)
WBC NRBC COR # BLD: 8.53 10*3/MM3 (ref 3.4–10.8)

## 2022-12-08 PROCEDURE — 0503F POSTPARTUM CARE VISIT: CPT | Performed by: OBSTETRICS & GYNECOLOGY

## 2022-12-08 PROCEDURE — 85025 COMPLETE CBC W/AUTO DIFF WBC: CPT | Performed by: OBSTETRICS & GYNECOLOGY

## 2022-12-08 PROCEDURE — 25010000002 KETOROLAC TROMETHAMINE PER 15 MG: Performed by: OBSTETRICS & GYNECOLOGY

## 2022-12-08 RX ADMIN — KETOROLAC TROMETHAMINE 15 MG: 15 INJECTION, SOLUTION INTRAMUSCULAR; INTRAVENOUS at 06:13

## 2022-12-08 RX ADMIN — ACETAMINOPHEN 1000 MG: 500 TABLET ORAL at 09:37

## 2022-12-08 RX ADMIN — IBUPROFEN 600 MG: 600 TABLET ORAL at 21:27

## 2022-12-08 RX ADMIN — ACETAMINOPHEN 1000 MG: 500 TABLET ORAL at 03:38

## 2022-12-08 RX ADMIN — DOCUSATE SODIUM 100 MG: 100 CAPSULE, LIQUID FILLED ORAL at 09:38

## 2022-12-08 RX ADMIN — KETOROLAC TROMETHAMINE 15 MG: 15 INJECTION, SOLUTION INTRAMUSCULAR; INTRAVENOUS at 01:20

## 2022-12-08 RX ADMIN — ACETAMINOPHEN 650 MG: 325 TABLET ORAL at 21:27

## 2022-12-08 RX ADMIN — IBUPROFEN 600 MG: 600 TABLET ORAL at 14:37

## 2022-12-08 RX ADMIN — ACETAMINOPHEN 650 MG: 325 TABLET ORAL at 16:39

## 2022-12-08 NOTE — PLAN OF CARE
Problem: Adult Inpatient Plan of Care  Goal: Plan of Care Review  Outcome: Ongoing, Progressing  Goal: Patient-Specific Goal (Individualized)  Outcome: Ongoing, Progressing  Goal: Absence of Hospital-Acquired Illness or Injury  Outcome: Ongoing, Progressing  Intervention: Identify and Manage Fall Risk  Recent Flowsheet Documentation  Taken 2022 0613 by Tatum Saucedo RN  Safety Promotion/Fall Prevention: safety round/check completed  Taken 2022 1940 by Tatum Saucedo RN  Safety Promotion/Fall Prevention: safety round/check completed  Intervention: Prevent Skin Injury  Recent Flowsheet Documentation  Taken 2022 2100 by Tatum Saucedo RN  Body Position: sitting up in bed  Intervention: Prevent and Manage VTE (Venous Thromboembolism) Risk  Recent Flowsheet Documentation  Taken 2022 2100 by Tatum Saucedo RN  Activity Management:   ambulated to bathroom   activity adjusted per tolerance  VTE Prevention/Management:   bilateral   sequential compression devices on  Range of Motion: active ROM (range of motion) encouraged  Intervention: Prevent Infection  Recent Flowsheet Documentation  Taken 2022 2100 by Tatum Saucedo RN  Infection Prevention:   visitors restricted/screened   single patient room provided   rest/sleep promoted   personal protective equipment utilized   hand hygiene promoted   equipment surfaces disinfected   environmental surveillance performed   cohorting utilized  Goal: Optimal Comfort and Wellbeing  Outcome: Ongoing, Progressing  Goal: Readiness for Transition of Care  Outcome: Ongoing, Progressing     Problem: Breastfeeding  Goal: Effective Breastfeeding  Outcome: Ongoing, Progressing     Problem: Adjustment to Role Transition (Postpartum  Delivery)  Goal: Successful Maternal Role Transition  Outcome: Ongoing, Progressing     Problem: Bleeding (Postpartum  Delivery)  Goal: Hemostasis  Outcome: Ongoing, Progressing     Problem: Infection (Postpartum   Delivery)  Goal: Absence of Infection Signs and Symptoms  Outcome: Ongoing, Progressing     Problem: Pain (Postpartum  Delivery)  Goal: Acceptable Pain Control  Outcome: Ongoing, Progressing     Problem: Postoperative Nausea and Vomiting (Postpartum  Delivery)  Goal: Nausea and Vomiting Relief  Outcome: Ongoing, Progressing     Problem: Postoperative Urinary Retention (Postpartum  Delivery)  Goal: Effective Urinary Elimination  Outcome: Ongoing, Progressing   Goal Outcome Evaluation:  VS Stable  Bleeding Scant  Tolerating PO fluids and food  Positive maternal-infant bonding

## 2022-12-08 NOTE — PROGRESS NOTES
PostPartum/PostOp PROGRESS NOTE        Subjective:  • Patient has no complaints  • Pain controlled  • Tolerating a regular diet  • Passing flatus  • Ambulating  • Urinating spontaneously  • Lochia decreasing, no bleeding concerns  • No lightheadedness or dizziness    Objective:   Vitals: reviewed  General- NAD, alert and oriented, appropriate  Psych- Normal mood, good memory  Cardiovascular/Respiratory- CV- Regular rhythm, no murnurs, Resp- CTA to bases, no wheezes  Abdomen- Fundus firm, non tender, Soft, non distended, non tender, normal active bowel sounds, Bandage removed, Incision clean, dry, intact, Staples in place and Fundus at U-1  Extremties/DTRs- +1 edema, bilaterally equal, no signs of DVT    Results from last 7 days   Lab Units 12/08/22  0525 12/07/22  0850   WBC 10*3/mm3 8.53 7.76   HEMOGLOBIN g/dL 10.0* 11.1*   HEMATOCRIT % 29.7* 32.9*   PLATELETS 10*3/mm3 210 263         Assessment:    Post-partum/postop Day:  1  The patient is currently breastfeeding.      Plan:   • Routine postpartum/postop care  • Ambulate  • Remove IV  • Remove bandage  • Shower  • PO pain meds  • Importance of wound care/keep clean and dry  • Remove staples on day of DC, place steris  • Breast feeding support  • DC meds reviewed  • Follow up scheduled  • PP/PO precautions given  • OB Hospitalist will see pt tomorrow.  Any dc Rx for narcotics will be determined by usage during hospitalization.              Electronically signed by Akua Be DO, 12/08/22, 7:33 AM EST.

## 2022-12-08 NOTE — ANESTHESIA POSTPROCEDURE EVALUATION
Patient: Zohra Cosme    Procedure Summary     Date: 22 Room / Location: [facility] LABOR DELIVERY  [facility] LABOR DELIVERY    Anesthesia Start: 1020 Anesthesia Stop: 1135    Procedure:  SECTION REPEAT (Bilateral: Abdomen) Diagnosis: (REPEAT WITH [facility], )    Surgeons: Akua Be DO Provider: Addison Hinds MD    Anesthesia Type: spinal ASA Status: 2          Anesthesia Type: spinal    Vitals  Vitals Value Taken Time   /61 22 0517   Temp 36.8 °C (98.2 °F) 22 0517   Pulse 73 22 0517   Resp 16 22 0517   SpO2 99 % 22 1330           Post Anesthesia Care and Evaluation    Patient location during evaluation: bedside  Patient participation: complete - patient participated  Level of consciousness: awake  Pain management: adequate    Airway patency: patent  Anesthetic complications: No anesthetic complications  PONV Status: none  Cardiovascular status: acceptable and stable  Respiratory status: acceptable  Hydration status: acceptable    Comments: An Anesthesiologist personally participated in the most demanding procedures (including induction and emergence if applicable) in the anesthesia plan, monitored the course of anesthesia administration at frequent intervals and remained physically present and available for immediate diagnosis and treatment of emergencies.

## 2022-12-09 VITALS
SYSTOLIC BLOOD PRESSURE: 138 MMHG | RESPIRATION RATE: 18 BRPM | OXYGEN SATURATION: 99 % | HEIGHT: 64 IN | WEIGHT: 158 LBS | BODY MASS INDEX: 26.98 KG/M2 | TEMPERATURE: 98.2 F | HEART RATE: 65 BPM | DIASTOLIC BLOOD PRESSURE: 72 MMHG

## 2022-12-09 RX ORDER — FERROUS SULFATE 325(65) MG
325 TABLET ORAL EVERY OTHER DAY
Qty: 30 TABLET | Refills: 1 | Status: SHIPPED | OUTPATIENT
Start: 2022-12-09

## 2022-12-09 RX ORDER — IBUPROFEN 800 MG/1
800 TABLET ORAL EVERY 8 HOURS PRN
Qty: 30 TABLET | Refills: 1 | Status: SHIPPED | OUTPATIENT
Start: 2022-12-09 | End: 2022-12-19

## 2022-12-09 RX ORDER — DOCUSATE SODIUM 100 MG/1
100 CAPSULE, LIQUID FILLED ORAL 2 TIMES DAILY PRN
Qty: 60 CAPSULE | Refills: 1 | Status: SHIPPED | OUTPATIENT
Start: 2022-12-09

## 2022-12-09 RX ORDER — OXYCODONE HYDROCHLORIDE 5 MG/1
5 TABLET ORAL EVERY 4 HOURS PRN
Qty: 8 TABLET | Refills: 0 | Status: SHIPPED | OUTPATIENT
Start: 2022-12-09

## 2022-12-09 RX ORDER — ACETAMINOPHEN 325 MG/1
650 TABLET ORAL EVERY 6 HOURS PRN
Qty: 30 TABLET | Refills: 1 | Status: SHIPPED | OUTPATIENT
Start: 2022-12-09

## 2022-12-09 RX ADMIN — ACETAMINOPHEN 650 MG: 325 TABLET ORAL at 02:34

## 2022-12-09 RX ADMIN — IBUPROFEN 600 MG: 600 TABLET ORAL at 02:35

## 2022-12-09 RX ADMIN — IBUPROFEN 600 MG: 600 TABLET ORAL at 08:40

## 2022-12-09 RX ADMIN — ACETAMINOPHEN 650 MG: 325 TABLET ORAL at 08:41

## 2022-12-09 RX ADMIN — DOCUSATE SODIUM 100 MG: 100 CAPSULE, LIQUID FILLED ORAL at 08:41

## 2022-12-09 NOTE — PROGRESS NOTES
FAVIAN Lexi  [place] Delivery Progress Note    Subjective   Postpartum Day 2: [place] Delivery    The patient feels well.  Her pain is well controlled.   She is ambulating well.  Patient describes her bleeding as moderate lochia.  Patient is tolerating regular diet and urinating without difficulty.     .    Objective     Vital Signs Range for the last 24 hours  Temperature: Temp:  [98 °F (36.7 °C)-98.8 °F (37.1 °C)] 98.2 °F (36.8 °C)   Temp Source: Temp src: Oral   BP: BP: (110-138)/(64-73) 138/72   Pulse: Heart Rate:  [65-79] 65   Respirations: Resp:  [18] 18     Physical Exam:  General:  no acute distress.  Abdomen: abdomen is soft without significant tenderness, masses, organomegaly or guarding.   Fundus: appropriate, firm, non tender, moderate lochia  Incision: clean, dry and intact  Extremities: normal, atraumatic, no cyanosis, and trace edema.     Rubella:   Rubella Antibodies, IgG   Date Value Ref Range Status   2022 1.07 Immune >0.99 index Final     Comment:                                     Non-immune       <0.90                                  Equivocal  0.90 - 0.99                                  Immune           >0.99     Rh Status:    RH type   Date Value Ref Range Status   2022 Positive  Final     Immunizations: There is no immunization history for the selected administration types on file for this patient.    Assessment & Plan       H/O:     Single delivery by       Zohra Cosme is Day 2  post-partum  , Low Transverse   .      Plan:  Continue current care.      Electronically signed by Olaf Renae MD, 22, 8:54 AM EST.

## 2022-12-09 NOTE — PLAN OF CARE
Problem: Adult Inpatient Plan of Care  Goal: Plan of Care Review  Outcome: Met  Flowsheets (Taken 2022 1245)  Progress: improving  Plan of Care Reviewed With:   patient   spouse  Goal: Patient-Specific Goal (Individualized)  Outcome: Met  Goal: Absence of Hospital-Acquired Illness or Injury  Outcome: Met  Intervention: Identify and Manage Fall Risk  Recent Flowsheet Documentation  Taken 2022 0900 by Amelia Roman RN  Safety Promotion/Fall Prevention: safety round/check completed  Taken 2022 0845 by Amelia Roman RN  Safety Promotion/Fall Prevention: safety round/check completed  Taken 2022 0800 by Amelia Roman RN  Safety Promotion/Fall Prevention: safety round/check completed  Intervention: Prevent Skin Injury  Recent Flowsheet Documentation  Taken 2022 0845 by Amelia Roman RN  Body Position: position changed independently  Intervention: Prevent and Manage VTE (Venous Thromboembolism) Risk  Recent Flowsheet Documentation  Taken 2022 0845 by Amelia Roman RN  Activity Management: up ad felix  VTE Prevention/Management: (pt up ad felix) compression stockings off  Intervention: Prevent Infection  Recent Flowsheet Documentation  Taken 2022 0845 by Amelia Roman RN  Infection Prevention:   equipment surfaces disinfected   hand hygiene promoted   personal protective equipment utilized   rest/sleep promoted  Goal: Optimal Comfort and Wellbeing  Outcome: Met  Intervention: Monitor Pain and Promote Comfort  Recent Flowsheet Documentation  Taken 2022 0845 by Amelia Roman RN  Pain Management Interventions: no interventions per patient request  Goal: Readiness for Transition of Care  Outcome: Met     Problem: Bleeding ( Delivery)  Goal: Bleeding is Controlled  Outcome: Met     Problem: Change in Fetal Wellbeing ( Delivery)  Goal: Stable Fetal Wellbeing  Outcome: Met  Intervention: Promote and Monitor Fetal Wellbeing  Recent Flowsheet Documentation  Taken 2022 0845 by  Amelia Roman, RN  Body Position: position changed independently     Problem: Infection ( Delivery)  Goal: Absence of Infection Signs and Symptoms  Outcome: Met  Intervention: Minimize Infection Risk  Recent Flowsheet Documentation  Taken 2022 0845 by Amelia Roman RN  Infection Prevention:   equipment surfaces disinfected   hand hygiene promoted   personal protective equipment utilized   rest/sleep promoted     Problem: Respiratory Compromise ( Delivery)  Goal: Effective Oxygenation and Ventilation  Outcome: Met     Problem: Breastfeeding  Goal: Effective Breastfeeding  Outcome: Met     Problem: Adjustment to Role Transition (Postpartum  Delivery)  Goal: Successful Maternal Role Transition  Outcome: Met     Problem: Bleeding (Postpartum  Delivery)  Goal: Hemostasis  Outcome: Met     Problem: Infection (Postpartum  Delivery)  Goal: Absence of Infection Signs and Symptoms  Outcome: Met     Problem: Pain (Postpartum  Delivery)  Goal: Acceptable Pain Control  Outcome: Met  Intervention: Prevent or Manage Pain  Recent Flowsheet Documentation  Taken 202245 by Amelia Roman, RN  Pain Management Interventions: no interventions per patient request     Problem: Postoperative Nausea and Vomiting (Postpartum  Delivery)  Goal: Nausea and Vomiting Relief  Outcome: Met     Problem: Postoperative Urinary Retention (Postpartum  Delivery)  Goal: Effective Urinary Elimination  Outcome: Met   Goal Outcome Evaluation:  Plan of Care Reviewed With: patient, spouse        Progress: improving

## 2022-12-09 NOTE — PLAN OF CARE
Problem: Adult Inpatient Plan of Care  Goal: Plan of Care Review  Outcome: Ongoing, Progressing  Goal: Patient-Specific Goal (Individualized)  Outcome: Ongoing, Progressing  Goal: Absence of Hospital-Acquired Illness or Injury  Outcome: Ongoing, Progressing  Intervention: Identify and Manage Fall Risk  Recent Flowsheet Documentation  Taken 2022 0600 by Mickie Cardoza RN  Safety Promotion/Fall Prevention: safety round/check completed  Taken 2022 0500 by Mickie Cardoza RN  Safety Promotion/Fall Prevention: safety round/check completed  Taken 2022 0400 by Mickie Cardoza RN  Safety Promotion/Fall Prevention: safety round/check completed  Taken 2022 0300 by Mickie Cardoza RN  Safety Promotion/Fall Prevention: safety round/check completed  Taken 2022 0200 by Mickie Cardoza RN  Safety Promotion/Fall Prevention: safety round/check completed  Taken 2022 0100 by Mickie Cardoza RN  Safety Promotion/Fall Prevention: safety round/check completed  Goal: Optimal Comfort and Wellbeing  Outcome: Ongoing, Progressing  Goal: Readiness for Transition of Care  Outcome: Ongoing, Progressing     Problem: Bleeding ( Delivery)  Goal: Bleeding is Controlled  Outcome: Ongoing, Progressing     Problem: Change in Fetal Wellbeing ( Delivery)  Goal: Stable Fetal Wellbeing  Outcome: Ongoing, Progressing     Problem: Infection ( Delivery)  Goal: Absence of Infection Signs and Symptoms  Outcome: Ongoing, Progressing     Problem: Respiratory Compromise ( Delivery)  Goal: Effective Oxygenation and Ventilation  Outcome: Ongoing, Progressing     Problem: Breastfeeding  Goal: Effective Breastfeeding  Outcome: Ongoing, Progressing     Problem: Adjustment to Role Transition (Postpartum  Delivery)  Goal: Successful Maternal Role Transition  Outcome: Ongoing, Progressing     Problem: Bleeding (Postpartum  Delivery)  Goal: Hemostasis  Outcome: Ongoing, Progressing      Problem: Infection (Postpartum  Delivery)  Goal: Absence of Infection Signs and Symptoms  Outcome: Ongoing, Progressing     Problem: Pain (Postpartum  Delivery)  Goal: Acceptable Pain Control  Outcome: Ongoing, Progressing     Problem: Postoperative Nausea and Vomiting (Postpartum  Delivery)  Goal: Nausea and Vomiting Relief  Outcome: Ongoing, Progressing     Problem: Postoperative Urinary Retention (Postpartum  Delivery)  Goal: Effective Urinary Elimination  Outcome: Ongoing, Progressing   Goal Outcome Evaluation:

## 2022-12-09 NOTE — LACTATION NOTE
LC in to follow  Up with breastfeeding progress. LC noted that patient is tense today because baby is down over 9% from birth weight. LC observed this feeding and baby is sleepy at the breast but with stimulation he is suckling and swallowing well.  She has started pumping and is getting up to 8 ml / pumping session and they are syringe feeding this after attempting to breastfeed. LC encouraged post feed pumping with each feeding to get infant more intake at feedings. He is feeding now every 2 hours.  She is planning on d/c today and is to see her pedi tomorrow and they do have lactation support at this office. LC called to make them aware that patient is interested in lactation assistance. LC reweighed infant at 12 noon about 12 hours since last weight and minimal increased weight loss seen. Patient is planning on discharge today. LC discussed normal infant output patterns to expect and unlimited time/access to the breast but if infant is not waking by 3 hours to wake and feed using measures shown in the hospital. LC discussed checking to make sure new medications are safe to breastfeed. LC discussed alcohol use and cigarette/second hand smoke around baby and breastfeeding and discussed the impact of street drugs on infants and breastfeeding. LC used the page in the breastfeeding guide to discuss harmful effects of these. Breastfeeding/Lactation expectations and anticipatory guidance discussed for the next two weeks . LC discussed nipple care, plugged ducts, engorgement, and breast infection. LC encouraged mom to see pediatrician two days from discharge for follow up. Patient has a breastpump for home use and LC discussed good pumping guidelines and normal expectations with pumping and storage and preparation of ebm for feedings. LC discussed breastfeeding/lactation resources after discharge and when to call the doctor. Patient showed good understanding.

## 2022-12-18 ENCOUNTER — TELEPHONE (OUTPATIENT)
Dept: LACTATION | Facility: HOSPITAL | Age: 27
End: 2022-12-18

## 2022-12-18 NOTE — TELEPHONE ENCOUNTER
Pt states breast feeding is going good, baby is gaining wt, she has not started pumping yet. She has no questions or concerns at this time, she was encouraged to reach out to LC as needed.

## 2022-12-21 ENCOUNTER — TELEPHONE (OUTPATIENT)
Dept: OBSTETRICS AND GYNECOLOGY | Facility: CLINIC | Age: 27
End: 2022-12-21

## 2022-12-21 NOTE — TELEPHONE ENCOUNTER
LM for patient - Short term is ready - just waiting for the signed auth that was emailed to patient on 12/19/22

## 2023-01-10 NOTE — PROGRESS NOTES
POSTPARTUM Follow Up Visit      Chief Complaint   Patient presents with   • Postpartum Care     HPI:      • Date of delivery: 2022  • Delivery type:   LTCS  • Delivering Provider:   Dr. Akua Be      • Feeding: Breast  • Pain:  Soreness in incision area occasionally, right side of incision at edge.  No pain just soreness.  • Vaginal Bleeding:  no  • Depressed/Anxious:  no  EPDS score: 0   #10: 0  • Plans for BC:  Desires to discuss options  • Weight at last OB OV:  158lbs  • Last pap date and result: 2022 Neg    Discharge Summary by Akua Be DO (2022 07:20)     PHYSICAL EXAM:  /68   Wt 63.5 kg (140 lb)   LMP 2022   Breastfeeding Yes   BMI 24.03 kg/m²  11.3 kg (25 lb)  General- NAD, alert and oriented, appropriate  Psych- Normal mood, good memory, good eye contact    INCISION: Clean, dry, intact, no evidence of infection, no pain, no hernia w exam during valsalva  Abdomen- Soft, non distended, non tender, no masses    External genitalia- Normal.    Urethra/Bladder/Vagina- Normal, no masses, non-tender, no prolapse     Cervix- Normal, no lesions, no discharge, no CMT  Uterus- Normal size, shape & consistency.  Non tender, mobile, & no prolapse  Adnexa- Normal, no mass, non-tender    Lymphatic- No palpable groin nodes  Extremities- No edema    ASSESSMENT AND PLAN:  Diagnoses and all orders for this visit:    1. Postpartum follow-up (Primary)    2. Birth control counseling  Comments:  Considering IUDs, infor given.  Will call if desires.  Recommend abstinence 2weeks prior and BHCG.  Orders:  -     norethindrone (MICRONOR) 0.35 MG tablet; Take 1 tablet by mouth Daily.  Dispense: 90 tablet; Refill: 4    3. Vaccine counseling      Counseling:    • All birth control options reviewed in detail.  R/B/A/SE/E of each wrt pts PMHx and prior BC use  • May resume intercourse  • May resume normal activities  • Core strengthening exercises reviewed and recommended  • Kegel exercises  reviewed and recommended  • Ok to return to work/school  • Importance of COVID vaccine and booster  • Importance of FLU vaccine        Follow Up:  Return in about 1 year (around 1/13/2024) for WWE or if decides on IUD.          Akua Be,   01/13/2023    [facility]  [address] [place] [address]  Dept: [phone]  Dept Fax: [phone]  Loc: [phone]  Loc Fax: [phone]

## 2023-01-11 PROBLEM — Z98.891 H/O: C-SECTION: Status: RESOLVED | Noted: 2022-12-07 | Resolved: 2023-01-11

## 2023-01-13 ENCOUNTER — POSTPARTUM VISIT (OUTPATIENT)
Dept: OBSTETRICS AND GYNECOLOGY | Facility: CLINIC | Age: 28
End: 2023-01-13
Payer: COMMERCIAL

## 2023-01-13 VITALS — DIASTOLIC BLOOD PRESSURE: 68 MMHG | BODY MASS INDEX: 24.03 KG/M2 | SYSTOLIC BLOOD PRESSURE: 116 MMHG | WEIGHT: 140 LBS

## 2023-01-13 DIAGNOSIS — Z30.09 BIRTH CONTROL COUNSELING: ICD-10-CM

## 2023-01-13 DIAGNOSIS — Z71.85 VACCINE COUNSELING: ICD-10-CM

## 2023-01-13 PROCEDURE — 0503F POSTPARTUM CARE VISIT: CPT | Performed by: OBSTETRICS & GYNECOLOGY

## 2023-01-13 RX ORDER — ACETAMINOPHEN AND CODEINE PHOSPHATE 120; 12 MG/5ML; MG/5ML
1 SOLUTION ORAL DAILY
Qty: 90 TABLET | Refills: 4 | Status: SHIPPED | OUTPATIENT
Start: 2023-01-13

## 2023-11-28 ENCOUNTER — TELEPHONE (OUTPATIENT)
Dept: OBSTETRICS AND GYNECOLOGY | Facility: CLINIC | Age: 28
End: 2023-11-28
Payer: COMMERCIAL

## 2023-11-28 NOTE — TELEPHONE ENCOUNTER
11/28/23 11:40 a.m. Pt was notified of appt being cancelled. Per patient she is in class and will call us back to [email or web address]

## 2025-03-03 ENCOUNTER — TRANSCRIBE ORDERS (OUTPATIENT)
Dept: OBSTETRICS AND GYNECOLOGY | Facility: HOSPITAL | Age: 30
End: 2025-03-03
Payer: COMMERCIAL

## 2025-03-03 DIAGNOSIS — O28.3 ABNORMAL ULTRASOUND OF FETAL SPINE: ICD-10-CM

## 2025-03-03 DIAGNOSIS — Z34.90 PREGNANCY, UNSPECIFIED GESTATIONAL AGE: ICD-10-CM

## 2025-03-03 DIAGNOSIS — O28.3 ABNORMAL FETAL ULTRASOUND: Primary | ICD-10-CM

## 2025-03-19 ENCOUNTER — OFFICE VISIT (OUTPATIENT)
Dept: OBSTETRICS AND GYNECOLOGY | Facility: HOSPITAL | Age: 30
End: 2025-03-19
Payer: COMMERCIAL

## 2025-03-19 ENCOUNTER — HOSPITAL ENCOUNTER (OUTPATIENT)
Dept: WOMENS IMAGING | Facility: HOSPITAL | Age: 30
Discharge: HOME | End: 2025-03-19
Admitting: NURSE PRACTITIONER
Payer: COMMERCIAL

## 2025-03-19 VITALS — SYSTOLIC BLOOD PRESSURE: 119 MMHG | DIASTOLIC BLOOD PRESSURE: 68 MMHG | WEIGHT: 152 LBS | BODY MASS INDEX: 26.09 KG/M2

## 2025-03-19 DIAGNOSIS — O28.3 ABNORMAL ULTRASOUND OF FETAL SPINE: Primary | ICD-10-CM

## 2025-03-19 DIAGNOSIS — O43.122 VELAMENTOUS INSERTION OF UMBILICAL CORD IN SECOND TRIMESTER: ICD-10-CM

## 2025-03-19 DIAGNOSIS — O28.3 ABNORMAL ULTRASOUND OF FETAL SPINE: ICD-10-CM

## 2025-03-19 DIAGNOSIS — O28.3 ABNORMAL FETAL ULTRASOUND: ICD-10-CM

## 2025-03-19 DIAGNOSIS — Z34.90 PREGNANCY, UNSPECIFIED GESTATIONAL AGE: ICD-10-CM

## 2025-03-19 PROCEDURE — 76811 OB US DETAILED SNGL FETUS: CPT

## 2025-03-19 RX ORDER — LORATADINE 10 MG/1
10 CAPSULE, LIQUID FILLED ORAL DAILY
COMMUNITY

## 2025-03-19 RX ORDER — ONDANSETRON 4 MG/1
TABLET, ORALLY DISINTEGRATING ORAL
COMMUNITY
Start: 2025-01-12

## 2025-03-19 NOTE — LETTER
2025     KRALA Ramírez  [address] [place] [id number]    Patient: Zohra Cosme   YOB: 1995   Date of Visit: 3/19/2025       Dear KARLA Ramírez,    Thank you for referring Zohra Cosme to me for evaluation. Below is a copy of my consult note.    If you have questions, please do not hesitate to call me. I look forward to following Zohra along with you.         Sincerely,        Marie Horowitz MD        CC: No Recipients    Patient denies any leaking fluid, vaginal bleeding, or contractions.  NIPT neg.  Patient reports next follow-up appointment with BHAVESH Randhawa' office is 3/31.          Maternal/Fetal Medicine New Patient Note     Name: Zohra Cosme    : 1995     MRN: [id number]     Referring Provider: KARLA Ramírez    Chief Complaint  Concern fetal spine abnormality; Hx c/s x2    Subjective     History of Present Illness:  Zohra Cosme is a 29 y.o.  23w1d who presents today for evaluation in the setting of possible fetal spine abnormality   NIPS low risk   AFP normal   Prior CS x 2     DENNIS: Estimated Date of Delivery: 25     ROS:   As noted in HPI.     Past Medical History:   Diagnosis Date   • H/O:  2022   • Herpes labialis 2022    Valtrex suppression at 36 weeks      Past Surgical History:   Procedure Laterality Date   •  SECTION      [facility]   •  SECTION Bilateral 2022    Procedure:  SECTION REPEAT;  Surgeon: Akua Be DO;  Location: [facility] LABOR DELIVERY;  Service: Gynecology;  Laterality: Bilateral;   • DILATATION AND CURETTAGE        OB History          4    Para   2    Term   2            AB   1    Living   2         SAB   1    IAB        Ectopic        Molar        Multiple   0    Live Births   2                Current Outpatient Medications:   •  acetaminophen (Tylenol) 325 MG tablet, Take 2 tablets by mouth Every 6 (Six) Hours As  "Needed for Mild Pain (alternate with motrin)., Disp: 30 tablet, Rfl: 1  •  ferrous sulfate 325 (65 FE) MG tablet, Take 1 tablet by mouth Every Other Day., Disp: 30 tablet, Rfl: 1  •  Loratadine (Claritin) 10 MG capsule, Take 1 capsule by mouth Daily., Disp: , Rfl:   •  ondansetron ODT (ZOFRAN-ODT) 4 MG disintegrating tablet, , Disp: , Rfl:   •  Prenatal Vit-Fe Fumarate-FA (PRENATAL PO), Take  by mouth Daily., Disp: , Rfl:   •  docusate sodium (Colace) 100 MG capsule, Take 1 capsule by mouth 2 (Two) Times a Day As Needed for Constipation. (Patient not taking: Reported on 3/19/2025), Disp: 60 capsule, Rfl: 1  •  norethindrone (MICRONOR) 0.35 MG tablet, Take 1 tablet by mouth Daily. (Patient not taking: Reported on 3/19/2025), Disp: 90 tablet, Rfl: 4  •  oxyCODONE (Roxicodone) 5 MG immediate release tablet, Take 1 tablet by mouth Every 4 (Four) Hours As Needed for Moderate Pain. (Patient not taking: Reported on 3/19/2025), Disp: 8 tablet, Rfl: 0    Objective     Vital Signs  /68   Wt 68.9 kg (152 lb)   LMP 10/06/2024   Estimated body mass index is 26.09 kg/m² as calculated from the following:    Height as of 12/7/22: 162.6 cm (64\").    Weight as of this encounter: 68.9 kg (152 lb).    Ultrasound Impression:   See viewpoint    Assessment and Plan     Diagnoses and all orders for this visit:    1. Abnormal ultrasound of fetal spine (Primary)  Assessment & Plan:  Ultrasound today appears largely normal though in some views it is possible that there are several (2-4) hemivertibrae in the lower thoracic spine. There is no evidence of ONTD.   This was reviewed with the patient including the increased risk of abnormal spinal curvature postnatally   We discussed the possibility of fetal MRI and Peds Ortho consultation   At this point, we will plan to repeat views in 4 weeks time with further care planning based on that scan   In addition, a velamentous (fundal) cord insertion is noted. Serial growth scans recommended "   Follow up 4 weeks scheduled     Orders:  -     [facility]; Future    2. Velamentous insertion of umbilical cord in second trimester  -     [facility]; Future         Follow Up  4 weeks     I spent 30 minutes caring for the patient on the day of service. This included: obtaining or reviewing a separately obtained medical history, reviewing patient records, performing a medically appropriate exam and/or evaluation, counseling or educating the patient/family/caregiver, ordering medications, labs, and/or procedures and documenting such in the medical record. This does not include time spent on review and interpretation of other tests such as fetal ultrasound or the performance of other procedures such as amniocentesis or CVS.    Marie Horowitz MD FACOG  Maternal Fetal Medicine, [facility]    Diagn[facility] [place]     2025

## 2025-03-19 NOTE — PROGRESS NOTES
Patient denies any leaking fluid, vaginal bleeding, or contractions.  NIPT neg.  Patient reports next follow-up appointment with BHAVESH Randhawa' office is 3/31.

## 2025-03-22 PROBLEM — O43.122 VELAMENTOUS INSERTION OF UMBILICAL CORD IN SECOND TRIMESTER: Status: ACTIVE | Noted: 2025-03-22

## 2025-03-22 PROBLEM — O28.3 ABNORMAL ULTRASOUND OF FETAL SPINE: Status: ACTIVE | Noted: 2025-03-22

## 2025-03-23 NOTE — PROGRESS NOTES
Maternal/Fetal Medicine New Patient Note     Name: Zohra Cosme    : 1995     MRN: [id number]     Referring Provider: KARLA Ramírez    Chief Complaint  Concern fetal spine abnormality; Hx c/s x2    Subjective     History of Present Illness:  Zohra Cosme is a 29 y.o.  23w1d who presents today for evaluation in the setting of possible fetal spine abnormality   NIPS low risk   AFP normal   Prior CS x 2     DENNIS: Estimated Date of Delivery: 25     ROS:   As noted in HPI.     Past Medical History:   Diagnosis Date    H/O:  2022    Herpes labialis 2022    Valtrex suppression at 36 weeks      Past Surgical History:   Procedure Laterality Date     SECTION  2019    [facility]     SECTION Bilateral 2022    Procedure:  SECTION REPEAT;  Surgeon: Akua Be DO;  Location: [facility] LABOR DELIVERY;  Service: Gynecology;  Laterality: Bilateral;    DILATATION AND CURETTAGE        OB History          4    Para   2    Term   2            AB   1    Living   2         SAB   1    IAB        Ectopic        Molar        Multiple   0    Live Births   2                Current Outpatient Medications:     acetaminophen (Tylenol) 325 MG tablet, Take 2 tablets by mouth Every 6 (Six) Hours As Needed for Mild Pain (alternate with motrin)., Disp: 30 tablet, Rfl: 1    ferrous sulfate 325 (65 FE) MG tablet, Take 1 tablet by mouth Every Other Day., Disp: 30 tablet, Rfl: 1    Loratadine (Claritin) 10 MG capsule, Take 1 capsule by mouth Daily., Disp: , Rfl:     ondansetron ODT (ZOFRAN-ODT) 4 MG disintegrating tablet, , Disp: , Rfl:     Prenatal Vit-Fe Fumarate-FA (PRENATAL PO), Take  by mouth Daily., Disp: , Rfl:     docusate sodium (Colace) 100 MG capsule, Take 1 capsule by mouth 2 (Two) Times a Day As Needed for Constipation. (Patient not taking: Reported on 3/19/2025), Disp: 60 capsule, Rfl: 1    norethindrone (MICRONOR) 0.35 MG tablet,  "Take 1 tablet by mouth Daily. (Patient not taking: Reported on 3/19/2025), Disp: 90 tablet, Rfl: 4    oxyCODONE (Roxicodone) 5 MG immediate release tablet, Take 1 tablet by mouth Every 4 (Four) Hours As Needed for Moderate Pain. (Patient not taking: Reported on 3/19/2025), Disp: 8 tablet, Rfl: 0    Objective     Vital Signs  /68   Wt 68.9 kg (152 lb)   LMP 10/06/2024   Estimated body mass index is 26.09 kg/m² as calculated from the following:    Height as of 22: 162.6 cm (64\").    Weight as of this encounter: 68.9 kg (152 lb).    Ultrasound Impression:   See viewpoint    Assessment and Plan     Diagnoses and all orders for this visit:    1. Abnormal ultrasound of fetal spine (Primary)  Assessment & Plan:  Ultrasound today appears largely normal though in some views it is possible that there are several (2-4) hemivertibrae in the lower thoracic spine. There is no evidence of ONTD.   This was reviewed with the patient including the increased risk of abnormal spinal curvature postnatally   We discussed the possibility of fetal MRI and Peds Ortho consultation   At this point, we will plan to repeat views in 4 weeks time with further care planning based on that scan   In addition, a velamentous (fundal) cord insertion is noted. Serial growth scans recommended   Follow up 4 weeks scheduled     Orders:  -     [facility]; Future    2. Velamentous insertion of umbilical cord in second trimester  -     [facility]; Future         Follow Up  4 weeks     I spent 30 minutes caring for the patient on the day of service. This included: obtaining or reviewing a separately obtained medical history, reviewing patient records, performing a medically appropriate exam and/or evaluation, counseling or educating the patient/family/caregiver, ordering medications, labs, and/or procedures and documenting such in the medical record. This does not include time spent on review and " interpretation of other tests such as fetal ultrasound or the performance of other procedures such as amniocentesis or CVS.    Marie Horowitz MD FACOG  Maternal Fetal Medicine, [facility]     2025

## 2025-03-23 NOTE — ASSESSMENT & PLAN NOTE
Ultrasound today appears largely normal though in some views it is possible that there are several (2-4) hemivertibrae in the lower thoracic spine. There is no evidence of ONTD.   This was reviewed with the patient including the increased risk of abnormal spinal curvature postnatally   We discussed the possibility of fetal MRI and Peds Ortho consultation   At this point, we will plan to repeat views in 4 weeks time with further care planning based on that scan   In addition, a velamentous (fundal) cord insertion is noted. Serial growth scans recommended   Follow up 4 weeks scheduled

## 2025-04-16 ENCOUNTER — HOSPITAL ENCOUNTER (OUTPATIENT)
Dept: WOMENS IMAGING | Facility: HOSPITAL | Age: 30
Discharge: HOME | End: 2025-04-16
Admitting: OBSTETRICS & GYNECOLOGY
Payer: COMMERCIAL

## 2025-04-16 ENCOUNTER — OFFICE VISIT (OUTPATIENT)
Dept: OBSTETRICS AND GYNECOLOGY | Facility: HOSPITAL | Age: 30
End: 2025-04-16
Payer: COMMERCIAL

## 2025-04-16 VITALS — SYSTOLIC BLOOD PRESSURE: 110 MMHG | WEIGHT: 160 LBS | BODY MASS INDEX: 27.46 KG/M2 | DIASTOLIC BLOOD PRESSURE: 61 MMHG

## 2025-04-16 DIAGNOSIS — O28.3 ABNORMAL ULTRASOUND OF FETAL SPINE: ICD-10-CM

## 2025-04-16 DIAGNOSIS — O43.122 VELAMENTOUS INSERTION OF UMBILICAL CORD IN SECOND TRIMESTER: ICD-10-CM

## 2025-04-16 DIAGNOSIS — O28.3 ABNORMAL ULTRASOUND OF FETAL SPINE: Primary | ICD-10-CM

## 2025-04-16 PROCEDURE — 76816 OB US FOLLOW-UP PER FETUS: CPT

## 2025-04-16 NOTE — LETTER
2025     KARLA Ramírez  [address] [place] [id number]    Patient: Zohra Cosme   YOB: 1995   Date of Visit: 2025       Dear KARLA Ramírez,    Thank you for referring Zohra Cosme to me for evaluation. Below is a copy of my consult note.    If you have questions, please do not hesitate to call me. I look forward to following Zohra along with you.         Sincerely,        Alexandre Bazzi MD        CC: No Recipients    Patient denies any leaking fluid, vaginal bleeding, or contractions.  NIPT negative.  Patient reports next follow-up appointment with BHAVESH Randhawa' office is .          Maternal/Fetal Medicine Consult Note   Date: 2025  Name: Zohra Cosme    : 1995     MRN: [id number]     Referring Provider: KARLA Ramírez    Chief Complaint  hemivertebrae; velamentous PCI    Subjective     History of Present Illness:  Zohra Cosme is a 29 y.o.  26w5d who presents today for spinal abnormality    DENNIS: Estimated Date of Delivery: 25     ROS:   Otherwise Noted in HPI      Current Outpatient Medications:   •  acetaminophen (Tylenol) 325 MG tablet, Take 2 tablets by mouth Every 6 (Six) Hours As Needed for Mild Pain (alternate with motrin)., Disp: 30 tablet, Rfl: 1  •  Loratadine (Claritin) 10 MG capsule, Take 1 capsule by mouth Daily., Disp: , Rfl:   •  Prenatal Vit-Fe Fumarate-FA (PRENATAL PO), Take  by mouth Daily., Disp: , Rfl:   •  docusate sodium (Colace) 100 MG capsule, Take 1 capsule by mouth 2 (Two) Times a Day As Needed for Constipation. (Patient not taking: Reported on 2025), Disp: 60 capsule, Rfl: 1  •  ferrous sulfate 325 (65 FE) MG tablet, Take 1 tablet by mouth Every Other Day. (Patient not taking: Reported on 2025), Disp: 30 tablet, Rfl: 1  •  norethindrone (MICRONOR) 0.35 MG tablet, Take 1 tablet by mouth Daily. (Patient not taking: Reported on 2025), Disp: 90 tablet, Rfl: 4  •  ondansetron  "ODT (ZOFRAN-ODT) 4 MG disintegrating tablet, , Disp: , Rfl:   •  oxyCODONE (Roxicodone) 5 MG immediate release tablet, Take 1 tablet by mouth Every 4 (Four) Hours As Needed for Moderate Pain. (Patient not taking: Reported on 2025), Disp: 8 tablet, Rfl: 0    Objective     Vital Signs  /61   Wt 72.6 kg (160 lb)   LMP 10/06/2024   Estimated body mass index is 27.46 kg/m² as calculated from the following:    Height as of 22: 162.6 cm (64\").    Weight as of this encounter: 72.6 kg (160 lb).    Ultrasound Impression:   See Viewpoint     Assessment and Plan     Zohra Cosme is a 29 y.o.  26w5d who presents today for spinal abnormality    Diagnoses and all orders for this visit:    1. Abnormal ultrasound of fetal spine (Primary)  Assessment & Plan:  Today's ultrasound appears overall unchanged with spinal abnormality in the lower thoracic spine.  Fetal bladder appears normal and fetal extremities are moving without issue.  3D imaging is may be more consistent with scoliosis though it is hard to definitively diagnose.  We discussed option for fetal MRI and patient currently declines.  Will plan for serial evaluation in 4 weeks.           Follow Up  4 weeks    I spent 10 minutes caring for the patient on the day of service. This included: obtaining or reviewing a separately obtained medical history, reviewing patient records, performing a medically appropriate exam and/or evaluation, counseling or educating the patient/family/caregiver, ordering medications, labs, and/or procedures and documenting such in the medical record. This does not include time spent on review and interpretation of other tests such as fetal ultrasound or the performance of other procedures such as amniocentesis or CVS.      Alexandre Bazzi MD, FACOG  Maternal Fetal Medicine, [facility]    Diagnostic Center   "

## 2025-04-16 NOTE — PROGRESS NOTES
"    Maternal/Fetal Medicine Consult Note   Date: 2025  Name: Zohra Cosme    : 1995     MRN: [id number]     Referring Provider: KARLA Ramírez    Chief Complaint  hemivertebrae; velamentous PCI    Subjective     History of Present Illness:  Zohra Cosme is a 29 y.o.  26w5d who presents today for spinal abnormality    DENNIS: Estimated Date of Delivery: 25     ROS:   Otherwise Noted in HPI      Current Outpatient Medications:     acetaminophen (Tylenol) 325 MG tablet, Take 2 tablets by mouth Every 6 (Six) Hours As Needed for Mild Pain (alternate with motrin)., Disp: 30 tablet, Rfl: 1    Loratadine (Claritin) 10 MG capsule, Take 1 capsule by mouth Daily., Disp: , Rfl:     Prenatal Vit-Fe Fumarate-FA (PRENATAL PO), Take  by mouth Daily., Disp: , Rfl:     docusate sodium (Colace) 100 MG capsule, Take 1 capsule by mouth 2 (Two) Times a Day As Needed for Constipation. (Patient not taking: Reported on 2025), Disp: 60 capsule, Rfl: 1    ferrous sulfate 325 (65 FE) MG tablet, Take 1 tablet by mouth Every Other Day. (Patient not taking: Reported on 2025), Disp: 30 tablet, Rfl: 1    norethindrone (MICRONOR) 0.35 MG tablet, Take 1 tablet by mouth Daily. (Patient not taking: Reported on 2025), Disp: 90 tablet, Rfl: 4    ondansetron ODT (ZOFRAN-ODT) 4 MG disintegrating tablet, , Disp: , Rfl:     oxyCODONE (Roxicodone) 5 MG immediate release tablet, Take 1 tablet by mouth Every 4 (Four) Hours As Needed for Moderate Pain. (Patient not taking: Reported on 2025), Disp: 8 tablet, Rfl: 0    Objective     Vital Signs  /61   Wt 72.6 kg (160 lb)   LMP 10/06/2024   Estimated body mass index is 27.46 kg/m² as calculated from the following:    Height as of 22: 162.6 cm (64\").    Weight as of this encounter: 72.6 kg (160 lb).    Ultrasound Impression:   See Viewpoint     Assessment and Plan     Zohra Cosme is a 29 y.o.  26w5d who presents today for " spinal abnormality    Diagnoses and all orders for this visit:    1. Abnormal ultrasound of fetal spine (Primary)  Assessment & Plan:  Today's ultrasound appears overall unchanged with spinal abnormality in the lower thoracic spine.  Fetal bladder appears normal and fetal extremities are moving without issue.  3D imaging is may be more consistent with scoliosis though it is hard to definitively diagnose.  We discussed option for fetal MRI and patient currently declines.  Will plan for serial evaluation in 4 weeks.           Follow Up  4 weeks    I spent 10 minutes caring for the patient on the day of service. This included: obtaining or reviewing a separately obtained medical history, reviewing patient records, performing a medically appropriate exam and/or evaluation, counseling or educating the patient/family/caregiver, ordering medications, labs, and/or procedures and documenting such in the medical record. This does not include time spent on review and interpretation of other tests such as fetal ultrasound or the performance of other procedures such as amniocentesis or CVS.      Alexandre Bazzi MD, FACOG  Maternal Fetal Medicine, [facility] [place]

## 2025-04-16 NOTE — PROGRESS NOTES
Patient denies any leaking fluid, vaginal bleeding, or contractions.  NIPT negative.  Patient reports next follow-up appointment with BHAVESH Randhawa' office is 4/21.

## 2025-04-16 NOTE — ASSESSMENT & PLAN NOTE
Today's ultrasound appears overall unchanged with spinal abnormality in the lower thoracic spine.  Fetal bladder appears normal and fetal extremities are moving without issue.  3D imaging is may be more consistent with scoliosis though it is hard to definitively diagnose.  We discussed option for fetal MRI and patient currently declines.  Will plan for serial evaluation in 4 weeks.

## 2025-05-14 ENCOUNTER — HOSPITAL ENCOUNTER (OUTPATIENT)
Dept: WOMENS IMAGING | Facility: HOSPITAL | Age: 30
Discharge: HOME | End: 2025-05-14
Admitting: NURSE PRACTITIONER
Payer: COMMERCIAL

## 2025-05-14 ENCOUNTER — OFFICE VISIT (OUTPATIENT)
Dept: OBSTETRICS AND GYNECOLOGY | Facility: HOSPITAL | Age: 30
End: 2025-05-14
Payer: COMMERCIAL

## 2025-05-14 VITALS
HEIGHT: 64 IN | DIASTOLIC BLOOD PRESSURE: 82 MMHG | SYSTOLIC BLOOD PRESSURE: 136 MMHG | WEIGHT: 161.6 LBS | BODY MASS INDEX: 27.59 KG/M2

## 2025-05-14 DIAGNOSIS — O28.3 ABNORMAL ULTRASOUND OF FETAL SPINE: ICD-10-CM

## 2025-05-14 DIAGNOSIS — O43.122 VELAMENTOUS INSERTION OF UMBILICAL CORD IN SECOND TRIMESTER: Primary | ICD-10-CM

## 2025-05-14 PROCEDURE — 76816 OB US FOLLOW-UP PER FETUS: CPT

## 2025-05-14 PROCEDURE — 76819 FETAL BIOPHYS PROFIL W/O NST: CPT

## 2025-05-14 NOTE — PROGRESS NOTES
Patient denies any leaking of fluid, vaginal bleeding, or contractions.  NIPT negative.  Patient reports next follow-up appointment with BHAVESH Randhawa' office is 5/20.

## 2025-05-14 NOTE — LETTER
"May 19, 2025     KARLA Ramírez  [address] [place] [id number]    Patient: Zohra Cosme   YOB: 1995   Date of Visit: 2025     Dear KARLA Ramírez:       Thank you for referring Zohra Cosme to me for evaluation. Below are the relevant portions of my assessment and plan of care.    If you have questions, please do not hesitate to call me. I look forward to following Zohra along with you.         Sincerely,        Marie Horowitz MD        CC: No Recipients    Marie Horowitz MD  25  Sign when Signing Visit      Maternal/Fetal Medicine Follow Up Note     Name: Zohra Cosme    : 1995     MRN: [id number]     Referring Provider: KARLA Ramírez    Chief Complaint  scoliosis vs hemivertebrae; velamentous CI    Subjective     History of Present Illness:  Zohra Cosme is a 29 y.o.  31w3d who presents today for follow up     DENNIS: Estimated Date of Delivery: 25     ROS:   As noted in HPI.     Objective     Vital Signs  /82   Ht 162.6 cm (64\")   Wt 73.3 kg (161 lb 9.6 oz)   LMP 10/06/2024   Estimated body mass index is 27.74 kg/m² as calculated from the following:    Height as of this encounter: 162.6 cm (64\").    Weight as of this encounter: 73.3 kg (161 lb 9.6 oz).    Ultrasound Impression:   See viewpoint      Assessment and Plan     Zohra Cosme is a 29 y.o.  31w3d     Diagnoses and all orders for this visit:    1. Velamentous insertion of umbilical cord in second trimester (Primary)  -     [facility]; Future    2. Abnormal ultrasound of fetal spine  Assessment & Plan:  Stable appearing fetal ultrasound with what appears to be rakel-vertibrae.Velamentous umbilical cord noted. Normal fetal growth. Planned repeat CS at 39 weeks GA.  pediatric evaluation recommended. Follow up 4 weeks for repeat growth.     Orders:  -     [facility]; " Future         Follow Up  4 weeks     I spent 10 minutes caring for the patient on the day of service. This included: obtaining or reviewing a separately obtained medical history, reviewing patient records, performing a medically appropriate exam and/or evaluation, counseling or educating the patient/family/caregiver, ordering medications, labs, and/or procedures and documenting such in the medical record. This does not include time spent on review and interpretation of other tests such as fetal ultrasound or the performance of other procedures such as amniocentesis or CVS.    Marie Horowitz MD FACOG  Maternal Fetal Medicine, [facility]     2025

## 2025-05-20 NOTE — PROGRESS NOTES
"    Maternal/Fetal Medicine Follow Up Note     Name: Zohra Cosme    : 1995     MRN: [id number]     Referring Provider: KARLA Ramírez    Chief Complaint  scoliosis vs hemivertebrae; velamentous CI    Subjective     History of Present Illness:  Zohra Cosme is a 29 y.o.  31w3d who presents today for follow up     DENNIS: Estimated Date of Delivery: 25     ROS:   As noted in HPI.     Objective     Vital Signs  /82   Ht 162.6 cm (64\")   Wt 73.3 kg (161 lb 9.6 oz)   LMP 10/06/2024   Estimated body mass index is 27.74 kg/m² as calculated from the following:    Height as of this encounter: 162.6 cm (64\").    Weight as of this encounter: 73.3 kg (161 lb 9.6 oz).    Ultrasound Impression:   See viewpoint      Assessment and Plan     Zohra Cosme is a 29 y.o.  31w3d     Diagnoses and all orders for this visit:    1. Velamentous insertion of umbilical cord in second trimester (Primary)  -     [facility]; Future    2. Abnormal ultrasound of fetal spine  Assessment & Plan:  Stable appearing fetal ultrasound with what appears to be rakel-vertibrae.Velamentous umbilical cord noted. Normal fetal growth. Planned repeat CS at 39 weeks GA.  pediatric evaluation recommended. Follow up 4 weeks for repeat growth.     Orders:  -     [facility]; [facility]         Follow Up  4 weeks     I spent 10 minutes caring for the patient on the day of service. This included: obtaining or reviewing a separately obtained medical history, reviewing patient records, performing a medically appropriate exam and/or evaluation, counseling or educating the patient/family/caregiver, ordering medications, labs, and/or procedures and documenting such in the medical record. This does not include time spent on review and interpretation of other tests such as fetal ultrasound or the performance of other procedures such as amniocentesis or " CVS.    Marie Horowitz MD FACOG  Maternal Fetal Medicine, [facility]    Diagnostic Center     2025

## 2025-05-20 NOTE — ASSESSMENT & PLAN NOTE
Stable appearing fetal ultrasound with what appears to be rakel-vertibrae.Velamentous umbilical cord noted. Normal fetal growth. Planned repeat CS at 39 weeks GA.  pediatric evaluation recommended. Follow up 4 weeks for repeat growth.

## 2025-06-16 ENCOUNTER — HOSPITAL ENCOUNTER (OUTPATIENT)
Dept: WOMENS IMAGING | Facility: HOSPITAL | Age: 30
Discharge: HOME | End: 2025-06-16
Admitting: NURSE PRACTITIONER
Payer: COMMERCIAL

## 2025-06-16 ENCOUNTER — OFFICE VISIT (OUTPATIENT)
Dept: OBSTETRICS AND GYNECOLOGY | Facility: HOSPITAL | Age: 30
End: 2025-06-16
Payer: COMMERCIAL

## 2025-06-16 VITALS — BODY MASS INDEX: 28.25 KG/M2 | DIASTOLIC BLOOD PRESSURE: 70 MMHG | WEIGHT: 164.6 LBS | SYSTOLIC BLOOD PRESSURE: 135 MMHG

## 2025-06-16 DIAGNOSIS — O28.3 ABNORMAL ULTRASOUND OF FETAL SPINE: Primary | ICD-10-CM

## 2025-06-16 PROCEDURE — 76816 OB US FOLLOW-UP PER FETUS: CPT

## 2025-06-16 PROCEDURE — 76819 FETAL BIOPHYS PROFIL W/O NST: CPT

## 2025-06-16 NOTE — PROGRESS NOTES
Patient denies any leaking of fluid, vaginal bleeding, or contractions.  NIPT negative.  Patient reports next follow-up appointment with BHAVESH Randhawa' office is 6/23.

## 2025-06-22 NOTE — PROGRESS NOTES
Patient seen in  Diagnostic Center today for fetal ultrasound.    Please see ultrasound report under imaging tab of patient chart in Epic (Viewpoint report).    Marie Horowitz MD

## (undated) DEVICE — INTENDED FOR TISSUE SEPARATION, AND OTHER PROCEDURES THAT REQUIRE A SHARP SURGICAL BLADE TO PUNCTURE OR CUT.: Brand: BARD-PARKER ® CARBON RIB-BACK BLADES

## (undated) DEVICE — SUT MNCRYL 3/0 CT1 36 IN Y944H

## (undated) DEVICE — SUT VICRYL 3/0 CT1 27IN J258H

## (undated) DEVICE — DEV TRANSF BLD W/LUER ADPT CA/198

## (undated) DEVICE — PAD GRND REM POLYHESIVE A/ DISP

## (undated) DEVICE — TRY CATH FOL ADVANCE SIL W/BAG 16F

## (undated) DEVICE — NEEDLE,18GX1.5",REG,BEVEL: Brand: MEDLINE

## (undated) DEVICE — Device: Brand: PORTEX

## (undated) DEVICE — CVR HNDL LT SURG ACCSSRY BLU STRL

## (undated) DEVICE — STERILE POLYISOPRENE POWDER-FREE SURGICAL GLOVES WITH EMOLLIENT COATING: Brand: PROTEXIS

## (undated) DEVICE — PROXIMATE RH ROTATING HEAD SKIN STAPLERS (35 WIDE) CONTAINS 35 STAINLESS STEEL STAPLES: Brand: PROXIMATE

## (undated) DEVICE — SUT VIC 0 CTX 36IN J978H

## (undated) DEVICE — GLV SURG BIOGEL LTX PF 6 1/2

## (undated) DEVICE — C SECTION PACK: Brand: MEDLINE INDUSTRIES, INC.

## (undated) DEVICE — VIOLET BRAIDED (POLYGLACTIN 910), SYNTHETIC ABSORBABLE SUTURE: Brand: COATED VICRYL

## (undated) DEVICE — SUT CHRM 0 CT1 36IN 924H